# Patient Record
Sex: FEMALE | Race: BLACK OR AFRICAN AMERICAN | NOT HISPANIC OR LATINO | Employment: UNEMPLOYED | URBAN - NONMETROPOLITAN AREA
[De-identification: names, ages, dates, MRNs, and addresses within clinical notes are randomized per-mention and may not be internally consistent; named-entity substitution may affect disease eponyms.]

---

## 2023-12-03 ENCOUNTER — HOSPITAL ENCOUNTER (EMERGENCY)
Facility: HOSPITAL | Age: 38
Discharge: HOME | End: 2023-12-03
Attending: STUDENT IN AN ORGANIZED HEALTH CARE EDUCATION/TRAINING PROGRAM | Admitting: STUDENT IN AN ORGANIZED HEALTH CARE EDUCATION/TRAINING PROGRAM
Payer: COMMERCIAL

## 2023-12-03 VITALS
SYSTOLIC BLOOD PRESSURE: 143 MMHG | RESPIRATION RATE: 17 BRPM | OXYGEN SATURATION: 99 % | WEIGHT: 293 LBS | HEART RATE: 105 BPM | BODY MASS INDEX: 41.95 KG/M2 | TEMPERATURE: 97.7 F | DIASTOLIC BLOOD PRESSURE: 86 MMHG | HEIGHT: 70 IN

## 2023-12-03 DIAGNOSIS — R73.9 HYPERGLYCEMIA: Primary | ICD-10-CM

## 2023-12-03 LAB
ALBUMIN SERPL-MCNC: 4.3 G/DL (ref 3.5–5.2)
ALBUMIN/GLOB SERPL: 1.3 G/DL
ALP SERPL-CCNC: 109 U/L (ref 39–117)
ALT SERPL W P-5'-P-CCNC: 19 U/L (ref 1–33)
ANION GAP SERPL CALCULATED.3IONS-SCNC: 12.1 MMOL/L (ref 5–15)
AST SERPL-CCNC: 14 U/L (ref 1–32)
B-HCG UR QL: NEGATIVE
BASOPHILS # BLD AUTO: 0.02 10*3/MM3 (ref 0–0.2)
BASOPHILS NFR BLD AUTO: 0.3 % (ref 0–1.5)
BILIRUB SERPL-MCNC: 0.2 MG/DL (ref 0–1.2)
BILIRUB UR QL STRIP: NEGATIVE
BUN SERPL-MCNC: 16 MG/DL (ref 6–20)
BUN/CREAT SERPL: 13.9 (ref 7–25)
CALCIUM SPEC-SCNC: 9.3 MG/DL (ref 8.6–10.5)
CHLORIDE SERPL-SCNC: 101 MMOL/L (ref 98–107)
CLARITY UR: CLEAR
CO2 SERPL-SCNC: 17.9 MMOL/L (ref 22–29)
COLOR UR: YELLOW
CREAT SERPL-MCNC: 1.15 MG/DL (ref 0.57–1)
CRP SERPL-MCNC: 0.75 MG/DL (ref 0–0.5)
DEPRECATED RDW RBC AUTO: 43.1 FL (ref 37–54)
EGFRCR SERPLBLD CKD-EPI 2021: 62.7 ML/MIN/1.73
EOSINOPHIL # BLD AUTO: 0.07 10*3/MM3 (ref 0–0.4)
EOSINOPHIL NFR BLD AUTO: 1.1 % (ref 0.3–6.2)
ERYTHROCYTE [DISTWIDTH] IN BLOOD BY AUTOMATED COUNT: 14 % (ref 12.3–15.4)
FLUAV SUBTYP SPEC NAA+PROBE: NOT DETECTED
FLUBV RNA ISLT QL NAA+PROBE: NOT DETECTED
GLOBULIN UR ELPH-MCNC: 3.2 GM/DL
GLUCOSE BLDC GLUCOMTR-MCNC: 143 MG/DL (ref 70–130)
GLUCOSE SERPL-MCNC: 427 MG/DL (ref 65–99)
GLUCOSE UR STRIP-MCNC: ABNORMAL MG/DL
HCT VFR BLD AUTO: 34.8 % (ref 34–46.6)
HGB BLD-MCNC: 11.6 G/DL (ref 12–15.9)
HGB UR QL STRIP.AUTO: NEGATIVE
HOLD SPECIMEN: NORMAL
HOLD SPECIMEN: NORMAL
IMM GRANULOCYTES # BLD AUTO: 0.02 10*3/MM3 (ref 0–0.05)
IMM GRANULOCYTES NFR BLD AUTO: 0.3 % (ref 0–0.5)
KETONES UR QL STRIP: NEGATIVE
LEUKOCYTE ESTERASE UR QL STRIP.AUTO: NEGATIVE
LYMPHOCYTES # BLD AUTO: 2.53 10*3/MM3 (ref 0.7–3.1)
LYMPHOCYTES NFR BLD AUTO: 38.2 % (ref 19.6–45.3)
MAGNESIUM SERPL-MCNC: 1.8 MG/DL (ref 1.6–2.6)
MCH RBC QN AUTO: 28.1 PG (ref 26.6–33)
MCHC RBC AUTO-ENTMCNC: 33.3 G/DL (ref 31.5–35.7)
MCV RBC AUTO: 84.3 FL (ref 79–97)
MONOCYTES # BLD AUTO: 0.48 10*3/MM3 (ref 0.1–0.9)
MONOCYTES NFR BLD AUTO: 7.3 % (ref 5–12)
NEUTROPHILS NFR BLD AUTO: 3.5 10*3/MM3 (ref 1.7–7)
NEUTROPHILS NFR BLD AUTO: 52.8 % (ref 42.7–76)
NITRITE UR QL STRIP: NEGATIVE
NRBC BLD AUTO-RTO: 0 /100 WBC (ref 0–0.2)
PH UR STRIP.AUTO: 5.5 [PH] (ref 5–8)
PLATELET # BLD AUTO: 361 10*3/MM3 (ref 140–450)
PMV BLD AUTO: 9.6 FL (ref 6–12)
POTASSIUM SERPL-SCNC: 4.6 MMOL/L (ref 3.5–5.2)
PROCALCITONIN SERPL-MCNC: 0.05 NG/ML (ref 0–0.25)
PROT SERPL-MCNC: 7.5 G/DL (ref 6–8.5)
PROT UR QL STRIP: NEGATIVE
RBC # BLD AUTO: 4.13 10*6/MM3 (ref 3.77–5.28)
SARS-COV-2 RNA RESP QL NAA+PROBE: NOT DETECTED
SODIUM SERPL-SCNC: 131 MMOL/L (ref 136–145)
SP GR UR STRIP: >=1.03 (ref 1–1.03)
UROBILINOGEN UR QL STRIP: ABNORMAL
WBC NRBC COR # BLD AUTO: 6.62 10*3/MM3 (ref 3.4–10.8)
WHOLE BLOOD HOLD COAG: NORMAL
WHOLE BLOOD HOLD SPECIMEN: NORMAL

## 2023-12-03 PROCEDURE — 80053 COMPREHEN METABOLIC PANEL: CPT

## 2023-12-03 PROCEDURE — 85025 COMPLETE CBC W/AUTO DIFF WBC: CPT

## 2023-12-03 PROCEDURE — 87636 SARSCOV2 & INF A&B AMP PRB: CPT | Performed by: STUDENT IN AN ORGANIZED HEALTH CARE EDUCATION/TRAINING PROGRAM

## 2023-12-03 PROCEDURE — 86140 C-REACTIVE PROTEIN: CPT | Performed by: STUDENT IN AN ORGANIZED HEALTH CARE EDUCATION/TRAINING PROGRAM

## 2023-12-03 PROCEDURE — 81025 URINE PREGNANCY TEST: CPT | Performed by: STUDENT IN AN ORGANIZED HEALTH CARE EDUCATION/TRAINING PROGRAM

## 2023-12-03 PROCEDURE — 82948 REAGENT STRIP/BLOOD GLUCOSE: CPT

## 2023-12-03 PROCEDURE — 83735 ASSAY OF MAGNESIUM: CPT | Performed by: STUDENT IN AN ORGANIZED HEALTH CARE EDUCATION/TRAINING PROGRAM

## 2023-12-03 PROCEDURE — 25810000003 SODIUM CHLORIDE 0.9 % SOLUTION: Performed by: STUDENT IN AN ORGANIZED HEALTH CARE EDUCATION/TRAINING PROGRAM

## 2023-12-03 PROCEDURE — 81003 URINALYSIS AUTO W/O SCOPE: CPT

## 2023-12-03 PROCEDURE — 63710000001 INSULIN REGULAR HUMAN PER 5 UNITS: Performed by: STUDENT IN AN ORGANIZED HEALTH CARE EDUCATION/TRAINING PROGRAM

## 2023-12-03 PROCEDURE — 99283 EMERGENCY DEPT VISIT LOW MDM: CPT

## 2023-12-03 PROCEDURE — 84145 PROCALCITONIN (PCT): CPT | Performed by: STUDENT IN AN ORGANIZED HEALTH CARE EDUCATION/TRAINING PROGRAM

## 2023-12-03 RX ORDER — SODIUM CHLORIDE 0.9 % (FLUSH) 0.9 %
10 SYRINGE (ML) INJECTION AS NEEDED
Status: DISCONTINUED | OUTPATIENT
Start: 2023-12-03 | End: 2023-12-03 | Stop reason: HOSPADM

## 2023-12-03 RX ADMIN — HUMAN INSULIN 10 UNITS: 100 INJECTION, SOLUTION SUBCUTANEOUS at 12:04

## 2023-12-03 RX ADMIN — SODIUM CHLORIDE 1000 ML: 9 INJECTION, SOLUTION INTRAVENOUS at 11:05

## 2023-12-03 NOTE — Clinical Note
[facility] EMERGENCY DEPARTMENT  [address] [address]  Phone: [phone]    Alysia Lamas was seen and treated in our emergency department on 12/3/2023.  She may return to work on 12/06/2023.         Thank you for choosing [facility].    Romaine Mcknight MD

## 2023-12-03 NOTE — ED PROVIDER NOTES
Subjective:  History of Present Illness:    Patient is a 38-year-old female with history of obesity, hypertension, diabetes recently started on metformin who presents today with hyperglycemia.  Reports that she recently started her diabetes regimen.  Had just started checking her blood glucose at home.  Has not been consistently checking this.  Checked for the first time today and found her glucose to be greater than 400.  Called her PCPs office about this and they advised her to present emergently to our department for evaluation.  She does endorse diarrhea yesterday.  Has had some cough and congestion.  Denies chest pain or shortness of breath.  No abdominal pain.  No fever or chills.  Denies any dysuria.  No abnormal vaginal bleeding or discharge.      Nurses Notes reviewed and agree, including vitals, allergies, social history and prior medical history.     REVIEW OF SYSTEMS: All systems reviewed and not pertinent unless noted.  Review of Systems   Constitutional:  Positive for activity change. Negative for appetite change, chills, fatigue and fever.   HENT:  Positive for congestion. Negative for rhinorrhea, sinus pressure and sinus pain.    Eyes:  Negative for discharge and itching.   Respiratory:  Positive for cough. Negative for shortness of breath.    Cardiovascular:  Negative for chest pain and leg swelling.   Gastrointestinal:  Positive for diarrhea. Negative for abdominal distention, abdominal pain, constipation, nausea and vomiting.   Endocrine: Negative for cold intolerance and heat intolerance.   Genitourinary:  Negative for decreased urine volume, difficulty urinating, flank pain, frequency, urgency, vaginal bleeding, vaginal discharge and vaginal pain.   Musculoskeletal:  Negative for gait problem, neck pain and neck stiffness.   Skin:  Negative for color change.   Allergic/Immunologic: Negative for environmental allergies.   Neurological:  Negative for seizures, syncope, facial asymmetry and speech  "difficulty.   Psychiatric/Behavioral:  Negative for self-injury and suicidal ideas.        Past Medical History:   Diagnosis Date    Diabetes mellitus        Allergies:    Patient has no known allergies.      History reviewed. No pertinent surgical history.      Social History     Socioeconomic History    Marital status: Single         History reviewed. No pertinent family history.    Objective  Physical Exam:  /86   Pulse 105   Temp 97.7 °F (36.5 °C) (Oral)   Resp 17   Ht 177.8 cm (70\")   Wt 136 kg (300 lb)   LMP 11/11/2023 (Exact Date)   SpO2 99%   BMI 43.05 kg/m²      Physical Exam  Constitutional:       General: She is not in acute distress.     Appearance: Normal appearance. She is obese. She is not ill-appearing.   HENT:      Head: Normocephalic and atraumatic.      Nose: Nose normal. No congestion or rhinorrhea.      Mouth/Throat:      Mouth: Mucous membranes are dry.      Pharynx: Oropharynx is clear. No oropharyngeal exudate or posterior oropharyngeal erythema.   Eyes:      Extraocular Movements: Extraocular movements intact.      Conjunctiva/sclera: Conjunctivae normal.      Pupils: Pupils are equal, round, and reactive to light.   Cardiovascular:      Rate and Rhythm: Normal rate and regular rhythm.      Pulses: Normal pulses.      Heart sounds: Normal heart sounds.   Pulmonary:      Effort: Pulmonary effort is normal. No respiratory distress.      Breath sounds: Normal breath sounds.   Abdominal:      General: Abdomen is flat. Bowel sounds are normal. There is no distension.      Palpations: Abdomen is soft.      Tenderness: There is no abdominal tenderness. There is no guarding.   Musculoskeletal:         General: No swelling or tenderness. Normal range of motion.      Cervical back: Normal range of motion and neck supple.      Right lower leg: No edema.      Left lower leg: No edema.   Skin:     General: Skin is warm and dry.      Capillary Refill: Capillary refill takes less than 2 " seconds.   Neurological:      General: No focal deficit present.      Mental Status: She is alert and oriented to person, place, and time. Mental status is at baseline.      Cranial Nerves: No cranial nerve deficit.      Sensory: No sensory deficit.      Motor: No weakness.      Coordination: Coordination normal.   Psychiatric:         Mood and Affect: Mood normal.         Behavior: Behavior normal.         Thought Content: Thought content normal.         Judgment: Judgment normal.         Procedures    ED Course:         Lab Results (last 24 hours)       Procedure Component Value Units Date/Time    CBC & Differential [[id number]]  (Abnormal) Collected: 12/03/23 1059    Specimen: Blood Updated: 12/03/23 1107    Narrative:      The following orders were created for panel order CBC & Differential.  Procedure                               Abnormality         Status                     ---------                               -----------         ------                     CBC Auto Differential[[id number]]        Abnormal            Final result                 Please view results for these tests on the individual orders.    Comprehensive Metabolic Panel [[id number]]  (Abnormal) Collected: 12/03/23 1059    Specimen: Blood Updated: 12/03/23 1135     Glucose 427 mg/dL      BUN 16 mg/dL      Creatinine 1.15 mg/dL      Sodium 131 mmol/L      Potassium 4.6 mmol/L      Chloride 101 mmol/L      CO2 17.9 mmol/L      Calcium 9.3 mg/dL      Total Protein 7.5 g/dL      Albumin 4.3 g/dL      ALT (SGPT) 19 U/L      AST (SGOT) 14 U/L      Alkaline Phosphatase 109 U/L      Total Bilirubin 0.2 mg/dL      Globulin 3.2 gm/dL      A/G Ratio 1.3 g/dL      BUN/Creatinine Ratio 13.9     Anion Gap 12.1 mmol/L      eGFR 62.7 mL/min/1.73     Narrative:      GFR Normal >60  Chronic Kidney Disease <60  Kidney Failure <15      CBC Auto Differential [[id number]]  (Abnormal) Collected: 12/03/23 1059    Specimen: Blood Updated: 12/03/23 1107     WBC 6.62  10*3/mm3      RBC 4.13 10*6/mm3      Hemoglobin 11.6 g/dL      Hematocrit 34.8 %      MCV 84.3 fL      MCH 28.1 pg      MCHC 33.3 g/dL      RDW 14.0 %      RDW-SD 43.1 fl      MPV 9.6 fL      Platelets 361 10*3/mm3      Neutrophil % 52.8 %      Lymphocyte % 38.2 %      Monocyte % 7.3 %      Eosinophil % 1.1 %      Basophil % 0.3 %      Immature Grans % 0.3 %      Neutrophils, Absolute 3.50 10*3/mm3      Lymphocytes, Absolute 2.53 10*3/mm3      Monocytes, Absolute 0.48 10*3/mm3      Eosinophils, Absolute 0.07 10*3/mm3      Basophils, Absolute 0.02 10*3/mm3      Immature Grans, Absolute 0.02 10*3/mm3      nRBC 0.0 /100 WBC     Urinalysis With Microscopic If Indicated (No Culture) - Urine, Clean Catch [[id number]]  (Abnormal) Collected: 12/03/23 1100    Specimen: Urine, Clean Catch Updated: 12/03/23 1109     Color, UA Yellow     Appearance, UA Clear     pH, UA 5.5     Specific Gravity, UA >=1.030     Glucose, UA >=1000 mg/dL (3+)     Ketones, UA Negative     Bilirubin, UA Negative     Blood, UA Negative     Protein, UA Negative     Leuk Esterase, UA Negative     Nitrite, UA Negative     Urobilinogen, UA 0.2 E.U./dL    Narrative:      Urine microscopic not indicated.    Pregnancy, Urine - Urine, Clean Catch [[id number]]  (Normal) Collected: 12/03/23 1100    Specimen: Urine, Clean Catch Updated: 12/03/23 1111     HCG, Urine QL Negative    C-reactive Protein [[id number]]  (Abnormal) Collected: 12/03/23 1100    Specimen: Blood Updated: 12/03/23 1125     C-Reactive Protein 0.75 mg/dL     Procalcitonin [[id number]]  (Normal) Collected: 12/03/23 1100    Specimen: Blood Updated: 12/03/23 1135     Procalcitonin 0.05 ng/mL     Narrative:      As a Marker for Sepsis (Non-Neonates):    1. <0.5 ng/mL represents a low risk of severe sepsis and/or septic shock.  2. >2 ng/mL represents a high risk of severe sepsis and/or septic shock.    As a Marker for Lower Respiratory Tract Infections that require antibiotic therapy:    PCT on  "Admission    Antibiotic Therapy       6-12 Hrs later    >0.5                Strongly Recommended  >0.25 - <0.5        Recommended   0.1 - 0.25          Discouraged              Remeasure/reassess PCT  <0.1                Strongly Discouraged     Remeasure/reassess PCT    As 28 day mortality risk marker: \"Change in Procalcitonin Result\" (>80% or <=80%) if Day 0 (or Day 1) and Day 4 values are available. Refer to http://www.[email or web address]-pct-calculator.com    Change in PCT <=80%  A decrease of PCT levels below or equal to 80% defines a positive change in PCT test result representing a higher risk for 28-day all-cause mortality of patients diagnosed with severe sepsis for septic shock.    Change in PCT >80%  A decrease of PCT levels of more than 80% defines a negative change in PCT result representing a lower risk for 28-day all-cause mortality of patients diagnosed with severe sepsis or septic shock.       Magnesium [[id number]]  (Normal) Collected: 12/03/23 1100    Specimen: Blood Updated: 12/03/23 1125     Magnesium 1.8 mg/dL     COVID-19 and FLU A/B PCR, 1 HR TAT - Swab, Nasopharynx [[id number]]  (Normal) Collected: 12/03/23 1106    Specimen: Swab from Nasopharynx Updated: 12/03/23 1137     COVID19 Not Detected     Influenza A PCR Not Detected     Influenza B PCR Not Detected    Narrative:      Fact sheet for providers: https://www.fda.gov/media/986856/download    Fact sheet for patients: https://www.fda.gov/media/752363/download    Test performed by PCR.    POC Glucose Once [[id number]]  (Abnormal) Collected: 12/03/23 1232    Specimen: Blood Updated: 12/03/23 1236     Glucose 143 mg/dL      Comment: Serial Number: [id number]:  [id number]                No radiology results from the last 24 hrs       MDM      Initial impression of presenting illness: Hyperglycemia    DDX: includes but is not limited to: DKA, viral URI, type 2 diabetes    Patient arrives stable with vitals interpreted by myself.     Pertinent features " from physical exam: Clear to auscultation, regular rate and rhythm, no murmur, nontender to abdominal palpation.    Initial diagnostic plan: CBC, CMP, UA, CRP, Pro-Jacinto, COVID swab    Results from initial plan were reviewed and interpreted by me revealing no concern for DKA on lab work    Diagnostic information from other sources: Reviewed past medical records    Interventions / Re-evaluation: Given IV fluids on presentation, once DKA was ruled out, given 10 units of insulin with significant improvement    Results/clinical rationale were discussed with patient at bedside    Consultations/Discussion of results with other physicians: Discussed negative workup for DKA in our emergency department.  Encourage patient to follow-up with her primary care doctor for further evaluation and management of her diabetes.    Disposition plan: Discharge  -----        Final diagnoses:   Hyperglycemia          Romaine Mcknight MD  12/03/23 9012

## 2023-12-03 NOTE — DISCHARGE INSTRUCTIONS
You were evaluated due to high blood sugar.  We got labs which showed no concern for DKA.  We gave you IV fluids and insulin and your glucose improved.  You are now stable for discharge.  Would recommend continued follow-up with your primary care doctor for control of your diabetes and for any further changes that may be needed for your diabetes regimen.  You are now stable for discharge.

## 2025-02-25 PROBLEM — O09.519 ADVANCED MATERNAL AGE, PRIMIGRAVIDA, ANTEPARTUM: Status: ACTIVE | Noted: 2025-02-25

## 2025-02-25 PROBLEM — F19.11 HISTORY OF SUBSTANCE ABUSE: Status: ACTIVE | Noted: 2025-02-25

## 2025-02-25 PROBLEM — F17.200 SMOKER: Status: ACTIVE | Noted: 2025-02-25

## 2025-02-25 PROBLEM — O09.90 SUPERVISION OF HIGH RISK PREGNANCY, ANTEPARTUM: Status: ACTIVE | Noted: 2025-02-25

## 2025-02-25 PROBLEM — O99.210 OBESITY IN PREGNANCY, ANTEPARTUM: Status: ACTIVE | Noted: 2025-02-25

## 2025-02-25 PROBLEM — E11.9 TYPE 2 DIABETES MELLITUS: Status: ACTIVE | Noted: 2025-02-25

## 2025-02-25 PROBLEM — O10.919 CHRONIC HYPERTENSION AFFECTING PREGNANCY: Status: ACTIVE | Noted: 2025-02-25

## 2025-03-14 ENCOUNTER — INITIAL PRENATAL (OUTPATIENT)
Dept: OBSTETRICS AND GYNECOLOGY | Facility: CLINIC | Age: 40
End: 2025-03-14
Payer: COMMERCIAL

## 2025-03-14 ENCOUNTER — LAB (OUTPATIENT)
Dept: LAB | Facility: HOSPITAL | Age: 40
End: 2025-03-14
Payer: COMMERCIAL

## 2025-03-14 VITALS — SYSTOLIC BLOOD PRESSURE: 130 MMHG | WEIGHT: 293 LBS | BODY MASS INDEX: 43.48 KG/M2 | DIASTOLIC BLOOD PRESSURE: 76 MMHG

## 2025-03-14 DIAGNOSIS — O09.90 SUPERVISION OF HIGH RISK PREGNANCY, ANTEPARTUM: ICD-10-CM

## 2025-03-14 DIAGNOSIS — E11.9 TYPE 2 DIABETES MELLITUS WITHOUT COMPLICATION, WITHOUT LONG-TERM CURRENT USE OF INSULIN: ICD-10-CM

## 2025-03-14 DIAGNOSIS — O10.919 CHRONIC HYPERTENSION AFFECTING PREGNANCY: ICD-10-CM

## 2025-03-14 DIAGNOSIS — O09.519 ADVANCED MATERNAL AGE, PRIMIGRAVIDA, ANTEPARTUM: ICD-10-CM

## 2025-03-14 DIAGNOSIS — O09.90 SUPERVISION OF HIGH RISK PREGNANCY, ANTEPARTUM: Primary | ICD-10-CM

## 2025-03-14 PROBLEM — Z3A.12 12 WEEKS GESTATION OF PREGNANCY: Status: ACTIVE | Noted: 2025-03-14

## 2025-03-14 LAB
ABO GROUP BLD: NORMAL
ALBUMIN SERPL-MCNC: 3.2 G/DL (ref 3.5–5.2)
ALBUMIN/GLOB SERPL: 0.8 G/DL
ALP SERPL-CCNC: 86 U/L (ref 39–117)
ALT SERPL W P-5'-P-CCNC: 18 U/L (ref 1–33)
ANION GAP SERPL CALCULATED.3IONS-SCNC: 11.1 MMOL/L (ref 5–15)
AST SERPL-CCNC: 13 U/L (ref 1–32)
BASOPHILS # BLD AUTO: 0.02 10*3/MM3 (ref 0–0.2)
BASOPHILS NFR BLD AUTO: 0.2 % (ref 0–1.5)
BILIRUB SERPL-MCNC: 0.2 MG/DL (ref 0–1.2)
BLD GP AB SCN SERPL QL: NEGATIVE
BUN SERPL-MCNC: 7 MG/DL (ref 6–20)
BUN/CREAT SERPL: 9 (ref 7–25)
CALCIUM SPEC-SCNC: 9 MG/DL (ref 8.6–10.5)
CHLORIDE SERPL-SCNC: 104 MMOL/L (ref 98–107)
CO2 SERPL-SCNC: 20.9 MMOL/L (ref 22–29)
CREAT SERPL-MCNC: 0.78 MG/DL (ref 0.57–1)
DEPRECATED RDW RBC AUTO: 40.5 FL (ref 37–54)
EGFRCR SERPLBLD CKD-EPI 2021: 99.2 ML/MIN/1.73
EOSINOPHIL # BLD AUTO: 0.05 10*3/MM3 (ref 0–0.4)
EOSINOPHIL NFR BLD AUTO: 0.5 % (ref 0.3–6.2)
ERYTHROCYTE [DISTWIDTH] IN BLOOD BY AUTOMATED COUNT: 13.5 % (ref 12.3–15.4)
GLOBULIN UR ELPH-MCNC: 3.8 GM/DL
GLUCOSE SERPL-MCNC: 138 MG/DL (ref 65–99)
HBA1C MFR BLD: 8.8 % (ref 4.8–5.6)
HBV SURFACE AG SERPL QL IA: NORMAL
HCT VFR BLD AUTO: 33.6 % (ref 34–46.6)
HCV AB SER QL: NORMAL
HGB BLD-MCNC: 11 G/DL (ref 12–15.9)
HIV 1+2 AB+HIV1 P24 AG SERPL QL IA: NORMAL
IMM GRANULOCYTES # BLD AUTO: 0.04 10*3/MM3 (ref 0–0.05)
IMM GRANULOCYTES NFR BLD AUTO: 0.4 % (ref 0–0.5)
LYMPHOCYTES # BLD AUTO: 3.04 10*3/MM3 (ref 0.7–3.1)
LYMPHOCYTES NFR BLD AUTO: 28.4 % (ref 19.6–45.3)
MCH RBC QN AUTO: 26.8 PG (ref 26.6–33)
MCHC RBC AUTO-ENTMCNC: 32.7 G/DL (ref 31.5–35.7)
MCV RBC AUTO: 81.8 FL (ref 79–97)
MONOCYTES # BLD AUTO: 0.54 10*3/MM3 (ref 0.1–0.9)
MONOCYTES NFR BLD AUTO: 5.1 % (ref 5–12)
NEUTROPHILS NFR BLD AUTO: 65.4 % (ref 42.7–76)
NEUTROPHILS NFR BLD AUTO: 7 10*3/MM3 (ref 1.7–7)
NRBC BLD AUTO-RTO: 0 /100 WBC (ref 0–0.2)
PLATELET # BLD AUTO: 405 10*3/MM3 (ref 140–450)
PMV BLD AUTO: 9.2 FL (ref 6–12)
POTASSIUM SERPL-SCNC: 3.6 MMOL/L (ref 3.5–5.2)
PROT SERPL-MCNC: 7 G/DL (ref 6–8.5)
RBC # BLD AUTO: 4.11 10*6/MM3 (ref 3.77–5.28)
RH BLD: POSITIVE
RPR SER QL: NORMAL
SODIUM SERPL-SCNC: 136 MMOL/L (ref 136–145)
TSH SERPL DL<=0.05 MIU/L-ACNC: 0.43 UIU/ML (ref 0.27–4.2)
WBC NRBC COR # BLD AUTO: 10.69 10*3/MM3 (ref 3.4–10.8)

## 2025-03-14 PROCEDURE — 86592 SYPHILIS TEST NON-TREP QUAL: CPT

## 2025-03-14 PROCEDURE — 87340 HEPATITIS B SURFACE AG IA: CPT

## 2025-03-14 PROCEDURE — 86803 HEPATITIS C AB TEST: CPT

## 2025-03-14 PROCEDURE — 36415 COLL VENOUS BLD VENIPUNCTURE: CPT

## 2025-03-14 PROCEDURE — 86901 BLOOD TYPING SEROLOGIC RH(D): CPT

## 2025-03-14 PROCEDURE — 83036 HEMOGLOBIN GLYCOSYLATED A1C: CPT

## 2025-03-14 PROCEDURE — 80050 GENERAL HEALTH PANEL: CPT

## 2025-03-14 PROCEDURE — G0432 EIA HIV-1/HIV-2 SCREEN: HCPCS

## 2025-03-14 PROCEDURE — 86900 BLOOD TYPING SEROLOGIC ABO: CPT

## 2025-03-14 PROCEDURE — 86762 RUBELLA ANTIBODY: CPT

## 2025-03-14 PROCEDURE — 86850 RBC ANTIBODY SCREEN: CPT

## 2025-03-14 RX ORDER — ASPIRIN 81 MG/1
81 TABLET ORAL DAILY
Qty: 90 TABLET | Refills: 1 | Status: SHIPPED | OUTPATIENT
Start: 2025-03-14 | End: 2025-09-21

## 2025-03-14 RX ORDER — NIFEDIPINE 60 MG/1
60 TABLET, EXTENDED RELEASE ORAL DAILY
Qty: 90 TABLET | Refills: 2 | Status: SHIPPED | OUTPATIENT
Start: 2025-03-14

## 2025-03-14 NOTE — PROGRESS NOTES
Subjective     Chief Complaint   Patient presents with    Initial Prenatal Visit     NOB last seen 15 years        Alysia Lamas is a 39 y.o. year old .  Patient's last menstrual period was 12/15/2024..  She presents to be seen to initiate prenatal care with our practice.    She is currently having problems with none    As it relates to the pregnancy, she has concerns regarding Supervision of High Risk Pregnancy    AMA  Chronic HTN  Type 2 DM  Obesity  OUD in remission  Heavy Smoker    The following portions of the patient's history were reviewed and updated as appropriate:vital signs and She  has a past medical history of Anemia, Chlamydia, Diabetes, Diabetes mellitus, Gonorrhea, Hypertension, Urinary tract infection, and Urogenital trichomoniasis.  She does not have any pertinent problems on file.  She  has a past surgical history that includes Facial cosmetic surgery ().  Her family history is not on file.  She  reports that she has been smoking cigarettes. She has never used smokeless tobacco. She reports that she does not currently use alcohol. She reports that she does not use drugs.  Current Outpatient Medications   Medication Sig Dispense Refill    Blood Glucose Monitoring Suppl (FreeStyle Freedom Lite) w/Device kit 2 (Two) Times a Day. for testing      FREESTYLE LITE test strip 2 (Two) Times a Day. for testing      Lancets (freestyle) lancets USE FOR TESTING TWICE A DAY      pantoprazole (PROTONIX) 40 MG EC tablet Take 1 tablet by mouth Daily.      vitamin D3 (vitamin d) 125 MCG (5000 UT) capsule capsule Take 1 capsule by mouth every night at bedtime.      Vivitrol 380 MG reconstituted suspension IM suspension INJECT 380MG INTRAMUSCULARLY EVERY 28 DAYS      aspirin 81 MG EC tablet Take 1 tablet by mouth Daily for 191 days. 90 tablet 1    NIFEdipine XL (Procardia XL) 60 MG 24 hr tablet Take 1 tablet by mouth Daily. 90 tablet 2    sulfamethoxazole-trimethoprim (Bactrim DS) 800-160 MG per tablet  Take 1 tablet by mouth 2 (Two) Times a Day. 14 tablet 0     No current facility-administered medications for this visit.     Current Outpatient Medications on File Prior to Visit   Medication Sig    Blood Glucose Monitoring Suppl (FreeStyle Freedom Lite) w/Device kit 2 (Two) Times a Day. for testing    FREESTYLE LITE test strip 2 (Two) Times a Day. for testing    Lancets (freestyle) lancets USE FOR TESTING TWICE A DAY    pantoprazole (PROTONIX) 40 MG EC tablet Take 1 tablet by mouth Daily.    vitamin D3 (vitamin d) 125 MCG (5000 UT) capsule capsule Take 1 capsule by mouth every night at bedtime.    Vivitrol 380 MG reconstituted suspension IM suspension INJECT 380MG INTRAMUSCULARLY EVERY 28 DAYS    [DISCONTINUED] amLODIPine (NORVASC) 5 MG tablet Take 1 tablet by mouth Daily.    [DISCONTINUED] atorvastatin (LIPITOR) 20 MG tablet Take 1 tablet by mouth every night at bedtime.    [DISCONTINUED] Januvia 25 MG tablet Take 1 tablet by mouth Daily.    [DISCONTINUED] metFORMIN (GLUCOPHAGE) 500 MG tablet Take 1 tablet by mouth 2 (Two) Times a Day With Meals.    [DISCONTINUED] Ozempic, 1 MG/DOSE, 4 MG/3ML solution pen-injector INJECT 1 MG SUBCUTANEOUSLY ONE TIME PER WEEK -ON THE SAME DAY EACH WEEK    sulfamethoxazole-trimethoprim (Bactrim DS) 800-160 MG per tablet Take 1 tablet by mouth 2 (Two) Times a Day.    [DISCONTINUED] ibuprofen (ADVIL,MOTRIN) 600 MG tablet TAKE 1 TABLET BY MOUTH TWICE A DAY WITH FOOD AS NEEDED     No current facility-administered medications on file prior to visit.     She has no known allergies..    Review of Systems - 14 point reviewed and negative    Objective     Physical Exam  General:  obese - Body mass index is 43.48 kg/m².   Constitutional: healthy   Skin:  No suspicious lesions seen   Thyroid: normal to inspection and palpation   Lungs:  breathing is unlabored  clear to auscultation bilaterally   Heart:  regular rate and rhythm, S1, S2 normal, no murmur, click, rub or gallop   Breasts:   Examined in supine position  Symmetric without masses or skin dimpling  Nipples normal without inversion, lesions or discharge  There are no palpable axillary nodes   Abdomen: soft, non-tender; no masses  no umbilical or inginual hernias are present  no hepato-splenomegaly   Pelvis: Exam limited by  body habitus  Clinical staff was present for exam  External genitalia:  normal appearance of the external genitalia including Bartholin's and Bejou's glands.  :  urethral meatus normal; urethral hypermobility is absent.  Vaginal:  normal pink mucosa without prolapse or lesions. discharge present -  white, thick, and routine culture obtained;  Cervix:  normal appearance  Uterus:  symmetrically enlarged, consisent with 10-12 weeks size;  Adnexa:  normal bimanual exam of the adnexa.   Musculoskeletal: negative   Neuro: normal without focal findings, mental status, speech normal, alert and oriented x3, and ILEANA   Psych: oriented to time, place and person, mood and affect are within normal limits, pt is a good historian; no memory problems were noted       Lab Review   CBC, CMP, and Hep C PRC    Imaging   Pelvic ultrasound images independantly reviewed - CWD    Assessment & Plan     ASSESSMENT  IUP at 12w5d  AMA  Chronic HTN, long-standing. Well controled on Norvasc though recently stopped. Will re-start calcium channel blocker, routine labs low dose ASA  Type 2 DM On multiple meds but not insulin. Off meds now. Discussed glycemic goals, testing regimen, MFM consult. Use of insulin in pregnancy  Obesity. Reviewed diet exercise Maternal weight gain  Heavy Smoker. Motivate to stop. Strategies discussed  OUD in sustained remission on no MAT. Discussed risk of relapse and fatal overdose. Discussed early intervention as needed. May benefit from re-starting Vivitrol in pregnancy     PLAN  Tests ordered today:  Orders Placed This Encounter   Procedures    Gardnerella vaginalis, Trichomonas vaginalis, Candida albicans, DNA - Swab,  Vagina     Standing Status:   Future     Expected Date:   3/14/2025     Expiration Date:   3/7/2026     Release to patient:   Routine Release [[id number]]    [facility]     Standing Status:   Future     Expected Date:   3/21/2025     Expiration Date:   3/14/2026     Reason for Exam::   Dr. Lobo consultation diabetes in pregnancy     Release to patient:   Routine Release [[id number]]    Urinalysis With Culture If Indicated - Urine, Clean Catch     Standing Status:   Future     Expected Date:   3/14/2025     Expiration Date:   3/7/2026     Release to patient:   Routine Release [[id number]]    Urine Drug Screen - Urine, Clean Catch     Standing Status:   Future     Expected Date:   3/14/2025     Expiration Date:   3/7/2026     Release to patient:   Routine Release [[id number]]    HIV-1 / O / 2 Ag / Antibody     Standing Status:   Future     Expected Date:   3/14/2025     Expiration Date:   3/7/2026     Release to patient:   Routine Release [[id number]]    Obstetric Panel     Standing Status:   Future     Expected Date:   3/14/2025     Expiration Date:   3/7/2026     Release to patient:   Routine Release [[id number]]    Hemoglobin A1c     Standing Status:   Future     Expected Date:   3/14/2025     Expiration Date:   3/7/2026     Release to patient:   Routine Release [[id number]]    Protein, Urine, 24 Hour - Urine, Clean Catch     Standing Status:   Future     Expected Date:   3/14/2025     Expiration Date:   2026     Release to patient:   Routine Release [[id number]]    Comprehensive Metabolic Panel     Standing Status:   Future     Expected Date:   3/14/2025     Expiration Date:   3/14/2026     Release to patient:   Routine Release [[id number]]    [facility]netta Panorama Prenatal Test: Chromosomes 13, 18, 21, X & Y: Triploidy 22Q.11.2 Deletion - Blood,     ==========Department Information==========    ID: [id number]    Department:[facility]  [address] [address]  Dept: [phone]  Dept Fax: [phone]  Loc: [phone]  Loc Fax: [phone]       Standing Status:   Future     Expected Date:   3/14/2025     Expiration Date:   6/14/2026     Is patient pregnant?:   Yes     Expected due date (MM/DD/YYYY)::   9/21/2025     Is this a twin pregnancy? (viable, no vanished twin):   No     Is this a surrogate or egg donor pregnancy?:   No     I want fetal sex included in the report::   Yes     Patient's weight (lbs)::   303     Opt out of 22q11.2?:   No     Enroll this patient in the Automatic Redraw Program?:   Yes     What type of billing?:   [employer]     Did patient sign the Patient Acknowledgement?:   Yes     Did the ordering clinician sign the Statement of Informed Consent?:   Yes     Blood draw managed by Annetta?:   No     Release to patient:   Routine Release [[id number]]    Annetta Horizon 14 (Pan-Ethnic Standard) - Blood,     ==========Department Information==========    ID: [id number]    [facility]  [address] [id number]  Dept: [phone]  Dept Fax: [phone]  Loc: [phone]  Loc Fax: [phone]       Standing Status:   Future     Expected Date:   3/14/2025     Expiration Date:   6/14/2026     Is patient pregnant?:   Yes     Ethnicity of patient::        Is patient currently using hormonal medications?:   No     Did the ordering clinician sign the Statement of Informed Consent?:   Yes     What type of billing?:   [employer]     You acknowledge that the testing ordered consists of the H14 Pan-Ethnic Panel plus additional genes?:   Yes     Patient authorizes Annetta to share patient's Horizon test results with partner and their medical provider?:   No     Practice ensures that HIPAA consent is obtained and will make available to Annetta upon request?:   Yes     Did patient sign the Patient  Acknowledgement?:   Yes     Do you want Annetta to schedule mobile phlebotomy for this patient?:   No     Lloyd-Sac add-on test?:   No     Release to patient:   Routine Release [[id number]]    TSH     Standing Status:   Future     Expected Date:   3/14/2025     Expiration Date:   3/14/2026     Release to patient:   Routine Release [[id number]]    ECG 12 Lead     Standing Status:   Future     Expected Date:   3/14/2025     Expiration Date:   3/14/2026     Reason for Exam::   HTN High risk Pregnancy     Release to patient:   Routine Release [[id number]]    Creatinine Clearance - Urine, Clean Catch     Standing Status:   Future     Expected Date:   3/14/2025     Expiration Date:   6/14/2026     Release to patient:   Routine Release [[id number]]     Medications prescribed today:  New Medications Ordered This Visit   Medications    aspirin 81 MG EC tablet     Sig: Take 1 tablet by mouth Daily for 191 days.     Dispense:  90 tablet     Refill:  1    NIFEdipine XL (Procardia XL) 60 MG 24 hr tablet     Sig: Take 1 tablet by mouth Daily.     Dispense:  90 tablet     Refill:  2     Information reviewed: smoking in pregnancy, exercise in pregnancy, nutrition in pregnancy, weight gain in pregnancy, work and travel restrictions during pregnancy, list of OTC medications acceptable in pregnancy, and call coverage groups  Genetic testing reviewed: NIPT (Panorama) and Amniocentesis  The problem list for pregnancy was initiated today  Low Dose  ASA  MFM Consult      Follow up: 2 week(s)         Total time spent today with Alysia was 60 minutes (level 5) - preparing to see the patient, documenting the encounter, independently interpreting test results, counseling and educating the patient, coordinating care and answering her questions and counseling regarding pathophysiology of her presenting problem along with plans for any diagnositc work-up and treatment.  The time reported only includes face-to-face time and excludes any  procedural based activities that occurred on the same date of service.   This note was electronically signed.    Armando Martinez MD  March 14, 2025

## 2025-03-15 LAB — RUBV IGG SERPL IA-ACNC: 1.14 INDEX

## 2025-03-17 ENCOUNTER — REFERRAL TRIAGE (OUTPATIENT)
Dept: LABOR AND DELIVERY | Facility: HOSPITAL | Age: 40
End: 2025-03-17
Payer: COMMERCIAL

## 2025-03-17 PROBLEM — O99.019 MATERNAL ANEMIA IN PREGNANCY, ANTEPARTUM: Status: ACTIVE | Noted: 2025-03-17

## 2025-03-28 LAB
Lab: ABNORMAL
Lab: POSITIVE
NTRA ALPHA-THALASSEMIA: POSITIVE
NTRA BETA-HEMOGLOBINOPATHIES: NEGATIVE
NTRA CANAVAN DISEASE: NEGATIVE
NTRA CYSTIC FIBROSIS: NEGATIVE
NTRA DUCHENNE/BECKER MUSCULAR DYSTROPHY: NEGATIVE
NTRA FAMILIAL DYSAUTONOMIA: NEGATIVE
NTRA FRAGILE X SYNDROME: NEGATIVE
NTRA GALACTOSEMIA: NEGATIVE
NTRA GAUCHER DISEASE: NEGATIVE
NTRA MEDIUM CHAIN ACYL-COA DEHYDROGENASE DEFICIENCY: NEGATIVE
NTRA POLYCYSTIC KIDNEY DISEASE, AUTOSOMAL RECESSIVE: NEGATIVE
NTRA SMITH-LEMLI-OPITZ SYNDROME: NEGATIVE
NTRA SPINAL MUSCULAR ATROPHY: NEGATIVE
NTRA TAY-SACHS DISEASE: NEGATIVE

## 2025-03-31 RX ORDER — VITAMIN A, VITAMIN C, VITAMIN D-3, VITAMIN E, VITAMIN B-1, VITAMIN B-2, NIACIN, VITAMIN B-6, CALCIUM, IRON, ZINC, COPPER 4000; 120; 400; 22; 1.84; 3; 20; 10; 1; 12; 200; 27; 25; 2 [IU]/1; MG/1; [IU]/1; MG/1; MG/1; MG/1; MG/1; MG/1; MG/1; UG/1; MG/1; MG/1; MG/1; MG/1
1 TABLET ORAL DAILY
Qty: 100 TABLET | Refills: 3 | Status: SHIPPED | OUTPATIENT
Start: 2025-03-31

## 2025-04-01 ENCOUNTER — PATIENT OUTREACH (OUTPATIENT)
Dept: LABOR AND DELIVERY | Facility: HOSPITAL | Age: 40
End: 2025-04-01
Payer: COMMERCIAL

## 2025-04-01 NOTE — OUTREACH NOTE
[facility]  Enrollment    Current Estimated Gestational Age: 15w2d    Questions/Answers      Flowsheet Row Responses   Would like to participate? Yes   Date of Intake Visit 04/02/25              ANTON Mercado RN  Maternity Nurse Navigator    4/1/2025, 16:16 EDT

## 2025-04-02 ENCOUNTER — PATIENT OUTREACH (OUTPATIENT)
Dept: LABOR AND DELIVERY | Facility: HOSPITAL | Age: 40
End: 2025-04-02
Payer: COMMERCIAL
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
## 2025-04-07 ENCOUNTER — ROUTINE PRENATAL (OUTPATIENT)
Dept: OBSTETRICS AND GYNECOLOGY | Facility: CLINIC | Age: 40
End: 2025-04-07
Payer: COMMERCIAL

## 2025-04-07 VITALS — SYSTOLIC BLOOD PRESSURE: 122 MMHG | BODY MASS INDEX: 42.24 KG/M2 | WEIGHT: 293 LBS | DIASTOLIC BLOOD PRESSURE: 76 MMHG

## 2025-04-07 DIAGNOSIS — Z3A.16 16 WEEKS GESTATION OF PREGNANCY: ICD-10-CM

## 2025-04-07 DIAGNOSIS — O09.519 ADVANCED MATERNAL AGE, PRIMIGRAVIDA, ANTEPARTUM: ICD-10-CM

## 2025-04-07 DIAGNOSIS — F17.200 SMOKER: ICD-10-CM

## 2025-04-07 DIAGNOSIS — O09.90 SUPERVISION OF HIGH RISK PREGNANCY, ANTEPARTUM: Primary | ICD-10-CM

## 2025-04-07 DIAGNOSIS — E11.9 TYPE 2 DIABETES MELLITUS WITHOUT COMPLICATION, WITHOUT LONG-TERM CURRENT USE OF INSULIN: ICD-10-CM

## 2025-04-07 DIAGNOSIS — O10.919 CHRONIC HYPERTENSION AFFECTING PREGNANCY: ICD-10-CM

## 2025-04-07 PROCEDURE — 99214 OFFICE O/P EST MOD 30 MIN: CPT | Performed by: OBSTETRICS & GYNECOLOGY

## 2025-04-07 RX ORDER — AMLODIPINE BESYLATE 5 MG/1
TABLET ORAL
COMMUNITY
Start: 2025-04-03

## 2025-04-07 RX ORDER — SITAGLIPTIN 25 MG/1
25 TABLET, FILM COATED ORAL DAILY
COMMUNITY
Start: 2025-04-03

## 2025-04-07 RX ORDER — SEMAGLUTIDE 2.68 MG/ML
2 INJECTION, SOLUTION SUBCUTANEOUS WEEKLY
COMMUNITY
Start: 2025-03-06

## 2025-04-07 NOTE — PROGRESS NOTES
Chief Complaint   Patient presents with    Routine Prenatal Visit       HPI: Alysia is a  currently at 16w1d who today reports the following:Headache - No ; Visual change - No ; Swelling in legs - No ; Nausea - No ; Constipation - No; Diarrhea - No ; Contractions - No ; Leaking fluid - No ; Vaginal bleeding -  No.      ROS: Pertinent items are noted in HPI.  Vitals: See prenatal flowsheet     EXAM:  Abdomen: See physician component of prenatal flowsheet   Urine glucose/protein: See physician component of prenatal flowsheet   Pelvic: See physician component of prenatal flowsheet     Prenatal Labs  Lab Results   Component Value Date    HGB 11.0 (L) 2025    RUBELLAABIGG 1.14 2025    HEPBSAG Non-Reactive 2025    ABO B 2025    RH Positive 2025    ABSCRN Negative 2025    QSK3CMZ2 Non-Reactive 2025    HEPCVIRUSABY Non-Reactive 2025       MDM:  Impression:supervision of high risk pregnancy, Multiparous patient with medical complications, Tobacco Abuse, Obesity, AMA, and Carrier of Alpha Thalassemia Chronic HTN, baseline 24 hour urine not completed. Normotensive on current medication. Type 2 DM with sub-optimal control on oral agent. Fastings well above goal. Will initiate Nightly long-acting insulin awaiting scheduling of MFM consultation  Tests done today:  MSAFP  Specific topics discussed at today's visit:  Alpha thalassemia testing of FOB, ordered. Glycemic control and insulin use. Glycemic goals  New Medications Ordered This Visit   Medications    Insulin Glargine (LANTUS SOLOSTAR) 100 UNIT/ML injection pen     Sig: Inject 10 Units under the skin into the appropriate area as directed Every Night.     Dispense:  15 mL     Refill:  2     Orders Placed This Encounter   Procedures    [facility]     Standing Status:   Future     Expected Date:   2025     Expiration Date:   2026     Reason for Exam::   screen Consult re: Type 2 DM      Release to patient:   Routine Release [[id number]]    Alpha Fetoprotein, Maternal     Standing Status:   Future     Expected Date:   4/12/2025     Expiration Date:   7/7/2026     [facility] Date of last menstrual period or estimated date of delivery (corresponding to calculation method)::   9/21/2025     [facility] Gestational age calculation method::   JUNE,EDC     Release to patient:   Routine Release [[id number]]    Protein, Urine, 24 Hour - Urine, Clean Catch     Standing Status:   Future     Expected Date:   4/7/2025     Expiration Date:   6/14/2026     Release to patient:   Routine Release [[id number]]    Creatinine Clearance - Urine, Clean Catch     Standing Status:   Future     Expected Date:   4/7/2025     Expiration Date:   6/14/2026     Release to patient:   Routine Release [[id number]]       Next visit: MFM and appt in 2 weeks

## 2025-04-28 ENCOUNTER — HOSPITAL ENCOUNTER (OUTPATIENT)
Dept: WOMENS IMAGING | Facility: HOSPITAL | Age: 40
Discharge: HOME | End: 2025-04-28
Admitting: OBSTETRICS & GYNECOLOGY
Payer: COMMERCIAL

## 2025-04-28 ENCOUNTER — DOCUMENTATION (OUTPATIENT)
Dept: OBSTETRICS AND GYNECOLOGY | Facility: HOSPITAL | Age: 40
End: 2025-04-28
Payer: COMMERCIAL

## 2025-04-28 ENCOUNTER — ROUTINE PRENATAL (OUTPATIENT)
Dept: OBSTETRICS AND GYNECOLOGY | Facility: CLINIC | Age: 40
End: 2025-04-28
Payer: COMMERCIAL

## 2025-04-28 ENCOUNTER — OFFICE VISIT (OUTPATIENT)
Dept: OBSTETRICS AND GYNECOLOGY | Facility: HOSPITAL | Age: 40
End: 2025-04-28
Payer: COMMERCIAL

## 2025-04-28 VITALS — SYSTOLIC BLOOD PRESSURE: 125 MMHG | WEIGHT: 293 LBS | DIASTOLIC BLOOD PRESSURE: 55 MMHG | BODY MASS INDEX: 42.04 KG/M2

## 2025-04-28 VITALS — SYSTOLIC BLOOD PRESSURE: 125 MMHG | DIASTOLIC BLOOD PRESSURE: 55 MMHG | WEIGHT: 293 LBS | BODY MASS INDEX: 42.04 KG/M2

## 2025-04-28 DIAGNOSIS — E11.9 TYPE 2 DIABETES MELLITUS WITHOUT COMPLICATION, WITHOUT LONG-TERM CURRENT USE OF INSULIN: Primary | ICD-10-CM

## 2025-04-28 DIAGNOSIS — O09.519 ADVANCED MATERNAL AGE, PRIMIGRAVIDA, ANTEPARTUM: ICD-10-CM

## 2025-04-28 DIAGNOSIS — O09.90 SUPERVISION OF HIGH RISK PREGNANCY, ANTEPARTUM: ICD-10-CM

## 2025-04-28 DIAGNOSIS — F17.200 SMOKER: ICD-10-CM

## 2025-04-28 DIAGNOSIS — F19.11 HISTORY OF SUBSTANCE ABUSE: ICD-10-CM

## 2025-04-28 DIAGNOSIS — O10.919 CHRONIC HYPERTENSION AFFECTING PREGNANCY: ICD-10-CM

## 2025-04-28 DIAGNOSIS — O99.210 OBESITY IN PREGNANCY, ANTEPARTUM: ICD-10-CM

## 2025-04-28 DIAGNOSIS — D56.3 ALPHA THALASSEMIA SILENT CARRIER: ICD-10-CM

## 2025-04-28 DIAGNOSIS — Z3A.19 19 WEEKS GESTATION OF PREGNANCY: ICD-10-CM

## 2025-04-28 DIAGNOSIS — O99.019 MATERNAL ANEMIA IN PREGNANCY, ANTEPARTUM: ICD-10-CM

## 2025-04-28 DIAGNOSIS — E11.9 TYPE 2 DIABETES MELLITUS WITHOUT COMPLICATION, WITHOUT LONG-TERM CURRENT USE OF INSULIN: ICD-10-CM

## 2025-04-28 PROCEDURE — 99214 OFFICE O/P EST MOD 30 MIN: CPT | Performed by: OBSTETRICS & GYNECOLOGY

## 2025-04-28 PROCEDURE — 76811 OB US DETAILED SNGL FETUS: CPT

## 2025-04-28 PROCEDURE — 76811 OB US DETAILED SNGL FETUS: CPT | Performed by: OBSTETRICS & GYNECOLOGY

## 2025-04-28 PROCEDURE — 99203 OFFICE O/P NEW LOW 30 MIN: CPT | Performed by: OBSTETRICS & GYNECOLOGY

## 2025-04-28 RX ORDER — ACYCLOVIR 400 MG/1
1 TABLET ORAL ONCE
Qty: 3 EACH | Refills: 11 | Status: SHIPPED | OUTPATIENT
Start: 2025-04-28 | End: 2025-04-28

## 2025-04-28 RX ORDER — PEN NEEDLE, DIABETIC 30 GX3/16"
1 NEEDLE, DISPOSABLE MISCELLANEOUS 4 TIMES DAILY
Qty: 100 EACH | Refills: 2 | Status: SHIPPED | OUTPATIENT
Start: 2025-04-28

## 2025-04-28 RX ORDER — ACYCLOVIR 400 MG/1
1 TABLET ORAL ONCE
Qty: 1 EACH | Refills: 0 | Status: SHIPPED | OUTPATIENT
Start: 2025-04-28 | End: 2025-04-28

## 2025-04-28 NOTE — ASSESSMENT & PLAN NOTE
Pregestational diabetics have an 8-10% risk for having a child with a congenital malformation.  The risk is lower if the patient has good glycemic control prior to conception. Women who have a normal hemoglobin A1C, have an incidence of birth defects comparable to the general population risk of 3-5%.  Women whose hemoglobin A1C is elevated have the highest risk for fetal malformations and the risk is proportional to the degree of elevation. The most frequent malformations found in infants of diabetic mothers are heart defects. The infants may also have neural tube defects, caudal regression, and limb abnormalities, including femoral hypoplasia.  A periconceptual A1C of >8.5% has been associated with a >20% risk for congenital anomalies.  When the HgbA1C is severely elevated there is also an increased risk of miscarriage.      Today we discussed the risks of diabetes and pregnancy including maternal risks: increasing insulin requirements, operative delivery, preeclampsia, and infection; as well as fetal risks: malformations as discussed above, growth abnormalities, iatrogenic  birth and stillbirth.   morbidities among infants of diabetic mothers include hypoglycemia, hyperbilirubinemia, and metabolic instability.     I have given her our goals for pregnancy glucose control of fasting glucose 95, 2 hr postprandial glucose <120, and 1 hr postprandial glucose <140.  She is going to keep track of these 4 values daily     Recommendations:  - Baseline pre-eclampsia evaluation including 24 hour urine protein. Aspirin supplementation for pre-eclampsia prevention.   - EKG, Opthalmologic evaluation.   - Detailed anatomy ultrasound at 20 weeks  - Fetal echocardiogram at 24-26 weeks  - Serial growth assessment (every 4 wks)  -  testing: to begin at 32 wks unless indication develops prior.  - Continue glucose log and adjust medications as needed.  - Delivery should be based on glycemic control with ideal  delivery at 39 wks if well controlled and delivery prior necessitated by poor control.

## 2025-04-28 NOTE — ASSESSMENT & PLAN NOTE
Patient will be 39 at time of delivery which classifies patient as advanced maternal age. Patient has previously had NIPT drawn and is no increased risk. Today we discussed that her risk of having a child with aneuploidy is now 1 in [id number]. We discussed the limitations of NIPT and that it is a screening testing only for chromosome 21, 13, 18 and sex chromosomes. Patient was offered definitive diagnostic testing with amniocentesis, but patient currently declines.

## 2025-04-28 NOTE — PROGRESS NOTES
Spoke with patient in person.  Diabetic folder given.  Discussed keeping a blood sugar log until a Dexcom is approved, how to send a log in through MyChart and diet.  Dexcom alexandrea instruction given as well.  Informed patient that most insurance companies require a prior authorization and that can take a couple of days but to continue to keep a log until that time.  Patient voices understanding and denies any needs.  Patient states she was still taking her Ozempic, confirmed with Dr. Collado and informed patient she is to discontinue the Ozempic during the pregnancy.  Angela Caputo RN

## 2025-04-28 NOTE — PROGRESS NOTES
Maternal/Fetal Medicine Consult Note   Date: 2025  Name: Alysia Lamas    : 1985     MRN: [id number]     Referring Provider: Armando Martinez MD    Chief Complaint  T2DM; AMA; Smoke/vape; CHTN; MO    Subjective     History of Present Illness:  Alysia Lamas is a 39 y.o.  19w1d who presents today for AMA, chronic hypertension, type 2 diabetes, alpha thalassemia carrier    JUNE: Estimated Date of Delivery: 25     ROS:   Otherwise Noted in HPI    Past Medical History:   Diagnosis Date    Anemia     Chlamydia     Diabetes     Diabetes mellitus     Gonorrhea     Hypertension     Substance use disorder     in remission. Sober since 2023    Urinary tract infection     Urogenital trichomoniasis       Past Surgical History:   Procedure Laterality Date    FACIAL COSMETIC SURGERY        OB History          2    Para   1    Term   1            AB        Living   1         SAB        IAB        Ectopic        Molar        Multiple        Live Births   1                Current Outpatient Medications:     aspirin 81 MG EC tablet, Take 1 tablet by mouth Daily for 191 days., Disp: 90 tablet, Rfl: 1    Blood Glucose Monitoring Suppl (FreeStyle Freedom Lite) w/Device kit, 2 (Two) Times a Day. for testing, Disp: , Rfl:     Ferric Maltol (ACCRUFeR) 30 MG capsule, Take 1 capsule by mouth 2 (Two) Times a Day Before Meals., Disp: 100 capsule, Rfl: 2    FREESTYLE LITE test strip, 2 (Two) Times a Day. for testing, Disp: , Rfl:     Januvia 25 MG tablet, Take 1 tablet by mouth Daily., Disp: , Rfl:     Lancets (freestyle) lancets, USE FOR TESTING TWICE A DAY, Disp: , Rfl:     metFORMIN (GLUCOPHAGE) 1000 MG tablet, Take 1 tablet by mouth 2 (Two) Times a Day With Meals., Disp: , Rfl:     NIFEdipine XL (Procardia XL) 60 MG 24 hr tablet, Take 1 tablet by mouth Daily., Disp: 90 tablet, Rfl: 2    pantoprazole (PROTONIX) 40 MG EC tablet, Take 1 tablet by mouth Daily., Disp: , Rfl:     Prenatal  "Vit-Fe Fumarate-FA (Prenatal Vitamin Plus Low Iron) 27-1 MG tablet, Take 1 tablet by mouth Daily., Disp: 100 tablet, Rfl: 3    amLODIPine (NORVASC) 5 MG tablet, , Disp: , Rfl:     Insulin Glargine (LANTUS SOLOSTAR) 100 UNIT/ML injection pen, Inject 10 Units under the skin into the appropriate area as directed Every Night. (Patient not taking: Reported on 2025), Disp: 15 mL, Rfl: 2    Ozempic, 2 MG/DOSE, 8 MG/3ML solution pen-injector, Inject 2 mg under the skin into the appropriate area as directed 1 (One) Time Per Week. (Patient not taking: Reported on 2025), Disp: , Rfl:     Objective     Vital Signs  /55   Wt 133 kg (293 lb)   LMP 12/15/2024   Estimated body mass index is 42.04 kg/m² as calculated from the following:    Height as of 24: 177.8 cm (70\").    Weight as of this encounter: 133 kg (293 lb).    Ultrasound Impression:   See Viewpoint     Assessment and Plan     Alysia Lamas is a 39 y.o.  19w1d who presents today for AMA, chronic hypertension, type 2 diabetes, alpha thalassemia carrier    Diagnoses and all orders for this visit:    1. Type 2 diabetes mellitus without complication, without long-term current use of insulin (Primary)  Assessment & Plan:  Pregestational diabetics have an 8-10% risk for having a child with a congenital malformation.  The risk is lower if the patient has good glycemic control prior to conception. Women who have a normal hemoglobin A1C, have an incidence of birth defects comparable to the general population risk of 3-5%.  Women whose hemoglobin A1C is elevated have the highest risk for fetal malformations and the risk is proportional to the degree of elevation. The most frequent malformations found in infants of diabetic mothers are heart defects. The infants may also have neural tube defects, caudal regression, and limb abnormalities, including femoral hypoplasia.  A periconceptual A1C of >8.5% has been associated with a >20% risk for congenital " anomalies.  When the HgbA1C is severely elevated there is also an increased risk of miscarriage.      Today we discussed the risks of diabetes and pregnancy including maternal risks: increasing insulin requirements, operative delivery, preeclampsia, and infection; as well as fetal risks: malformations as discussed above, growth abnormalities, iatrogenic  birth and stillbirth.   morbidities among infants of diabetic mothers include hypoglycemia, hyperbilirubinemia, and metabolic instability.     I have given her our goals for pregnancy glucose control of fasting glucose 95, 2 hr postprandial glucose <120, and 1 hr postprandial glucose <140.  She is going to keep track of these 4 values daily     Recommendations:  - Baseline pre-eclampsia evaluation including 24 hour urine protein. Aspirin supplementation for pre-eclampsia prevention.   - EKG, Opthalmologic evaluation.   - Detailed anatomy ultrasound at 20 weeks  - Fetal echocardiogram at 24-26 weeks  - Serial growth assessment (every 4 wks)  -  testing: to begin at 32 wks unless indication develops prior.  - Continue glucose log and adjust medications as needed.  - Delivery should be based on glycemic control with ideal delivery at 39 wks if well controlled and delivery prior necessitated by poor control.        2. Chronic hypertension affecting pregnancy  Assessment & Plan:  Pt has a diagnosis of chronic hypertension.  Initial evaluation in these women should include renal function by 24 hr urine for protein and creatinine clearance.  HTN is associated with Fetal Growth Restriction (FGR) in about 15-25% of cases. HTN progresses to superimposed preeclampsia in about 25-35% of cases, usually recognized by the appearance of proteinuria & worsening hypertension.  These interventions do not decrease the risk of superimposed preeclampsia: prophylactic bedrest, work & activity restrictions, dietary changes, nutrient supplements, or prophylactic  antihypertensive therapy.      The only proven prevention strategy for preeclampsia is low dose aspirin during pregnancy.   In fact, the USPTF recommends low dose aspirin initiation after 12 wks gestation for preeclampsia prevention in these women.  We have instructed patient to check a blood pressure log and to report BPs 160/110s. With recent publishing of the CHAP trial, the goal for mild chronic hypertension is blood pressures <140/80. This trial showed decreased maternal morbidity with no change in  morbidity.    Recommendations:  -Currently on low dose aspirin  -Baseline labs: 24 hour urine not yet completed, serum labs wnl  -Monthly growth ultrasounds     - testing at 32 wks unless indication develops prior (usually with twice weekly NST's)   - Delivery based on BP control/complications but ideally at term        3. Advanced maternal age, primigravida, antepartum  Assessment & Plan:  Patient will be 39 at time of delivery which classifies patient as advanced maternal age. Patient has previously had NIPT drawn and is no increased risk. Today we discussed that her risk of having a child with aneuploidy is now 1 in [id number]. We discussed the limitations of NIPT and that it is a screening testing only for chromosome 21, 13, 18 and sex chromosomes. Patient was offered definitive diagnostic testing with amniocentesis, but patient currently declines.        4. Alpha thalassemia silent carrier  Assessment & Plan:  Alpha-thalassemia is a genetic condition characterized by microcytic hypochromic anemia caused by mutations in the HBA1 and HBA2 genes that make the alpha-globin protein. Alpha-thalassemia occurs more frequently in individuals with Mediterranean, North , ,  and Central and Southeast  ethnicity.  It is inherited in an autosomal recessive pattern.    Alpha-globin is made by four genes, two on each strand of chromosome 16.  An individual with one abnormal ?-globin  gene is a silent carrier of alpha-thalassemia.  Individuals with alpha-thalassemia trait have a deletion in two of the four copies o the genes.  Alpha-thalassemia trait can usually be detected through routine blood work that reveals hypochromic red blood cells.  The two deletions could be inherited separately (i.e., on different chromosomes) or together (i.e., on one chromosome).  It is important to determine whether the two deletions are inherited together because that increases the risk of having a child with Hemoglobin H disease or Hemoglobin Dante syndrome depending on the carrier status of the partner.  HbH disease results from deletion or inactivation of three ?-globin genes, usually as a result of the compound heterozygous state for ?°-thalassemia and ?+-thalassemia.  The phenotype of HbH disease (chronic microcytic, hypochromic hemolytic anemia of variable severity) mainly correlates with the severity of the ?+-thalassemia defect.  Hb Dante syndrome results from deletion of four ?-globin chains and rarely may involve non-deletion defects.      Hemoglobin Dante hydrops fetalis (Hb Dante) syndrome, the most severe form of ?-thalassemia, is characterized by fetal onset of generalized edema, ascites, pleural and pericardial effusion, and severe hypochromic anemia, in the absence of ABO or Rh blood group incompatibility.  It is usually detected by ultrasonography at 22 to 28 weeks' gestation and can be suspected in an at-risk pregnancy at 13 to 14 weeks' gestation when increased nuchal thickness, possible placental thickness and increased cardiothoracic ratio are present.  Death in the  period is almost inevitable.      In general, no therapy is required for alpha-thalassemia minor but it is important to avoid excessive iron administration as the premature breakdown of red blood cells with iron administration could result in iron overload.  Patients with alpha-thalassemia, however, do benefit from folic acid  supplementation usually in the range of 1-2 mg per day.             Follow Up  4 weeks    I spent 30 minutes caring for the patient on the day of service. This included: obtaining or reviewing a separately obtained medical history, reviewing patient records, performing a medically appropriate exam and/or evaluation, counseling or educating the patient/family/caregiver, ordering medications, labs, and/or procedures and documenting such in the medical record. This does not include time spent on review and interpretation of other tests such as fetal ultrasound or the performance of other procedures such as amniocentesis or CVS.      Deric Collado MD, FACOG  Maternal Fetal Medicine, [facility] [place]

## 2025-04-28 NOTE — PROGRESS NOTES
Patient denies any leaking fluid, vaginal bleeding, or contractions.  NIPT low risk.  Patient reports next follow-up appointment with Dr. Martinez's office is today.

## 2025-04-28 NOTE — ASSESSMENT & PLAN NOTE
Pt has a diagnosis of chronic hypertension.  Initial evaluation in these women should include renal function by 24 hr urine for protein and creatinine clearance.  HTN is associated with Fetal Growth Restriction (FGR) in about 15-25% of cases. HTN progresses to superimposed preeclampsia in about 25-35% of cases, usually recognized by the appearance of proteinuria & worsening hypertension.  These interventions do not decrease the risk of superimposed preeclampsia: prophylactic bedrest, work & activity restrictions, dietary changes, nutrient supplements, or prophylactic antihypertensive therapy.      The only proven prevention strategy for preeclampsia is low dose aspirin during pregnancy.   In fact, the USPTF recommends low dose aspirin initiation after 12 wks gestation for preeclampsia prevention in these women.  We have instructed patient to check a blood pressure log and to report BPs 160/110s. With recent publishing of the CHAP trial, the goal for mild chronic hypertension is blood pressures <140/80. This trial showed decreased maternal morbidity with no change in  morbidity.    Recommendations:  -Currently on low dose aspirin  -Baseline labs: 24 hour urine not yet completed, serum labs wnl  -Monthly growth ultrasounds     - testing at 32 wks unless indication develops prior (usually with twice weekly NST's)   - Delivery based on BP control/complications but ideally at term

## 2025-04-28 NOTE — PROGRESS NOTES
Chief Complaint   Patient presents with    Routine Prenatal Visit     Ob visit     Pregnancy Ultrasound     PDC ultrasound        HPI: Alysia is a  currently at 19w1d who today reports the following:Headache - No ; Visual change - No ; Swelling in legs - No ; Nausea - No ; Constipation - No; Diarrhea - No ; Contractions - No ; Leaking fluid - No ; Vaginal bleeding -  No.      ROS: Pertinent items are noted in HPI.  Vitals: See prenatal flowsheet     EXAM:  Abdomen: See physician component of prenatal flowsheet   Urine glucose/protein: See physician component of prenatal flowsheet   Pelvic: See physician component of prenatal flowsheet     Prenatal Labs  Lab Results   Component Value Date    HGB 11.0 (L) 2025    RUBELLAABIGG 1.14 2025    HEPBSAG Non-Reactive 2025    ABO B 2025    RH Positive 2025    ABSCRN Negative 2025    NPK3QSA3 Non-Reactive 2025    HEPCVIRUSABY Non-Reactive 2025       MDM:Hig-Risk Pregnancy  Impression:supervision of high risk pregnancy, Multiparous patient with medical complications, Diabetes mellitus, Chronic hypertension, Tobacco Abuse, Obesity, and AMA  Tests done today: U/S for anatomic screening   Specific topics discussed at today's visit:  Schedule of  testing. Insulin dosing and administration. She has not begun insulin having not had access to syringes. She continues oral hypoglycemic medication with sub-optimal glycemic control She has Ophthalmology appt scheduled. She plans to complete the 24 hour urine collection previously scheduled  She continues low-dose ASA  We discussed  the rationale for MSAFP testing in pregnancy, declines  New Medications Ordered This Visit   Medications    Insulin Syringes, Disposable, U-100 1 ML misc     Sig: Use 1 syringe 4 (Four) Times a Day.     Dispense:  100 each     Refill:  1     Next visit: MFM scan for growth

## 2025-04-28 NOTE — ASSESSMENT & PLAN NOTE
Alpha-thalassemia is a genetic condition characterized by microcytic hypochromic anemia caused by mutations in the HBA1 and HBA2 genes that make the alpha-globin protein. Alpha-thalassemia occurs more frequently in individuals with Mediterranean, North , ,  and Central and Southeast  ethnicity.  It is inherited in an autosomal recessive pattern.    Alpha-globin is made by four genes, two on each strand of chromosome 16.  An individual with one abnormal ?-globin gene is a silent carrier of alpha-thalassemia.  Individuals with alpha-thalassemia trait have a deletion in two of the four copies o the genes.  Alpha-thalassemia trait can usually be detected through routine blood work that reveals hypochromic red blood cells.  The two deletions could be inherited separately (i.e., on different chromosomes) or together (i.e., on one chromosome).  It is important to determine whether the two deletions are inherited together because that increases the risk of having a child with Hemoglobin H disease or Hemoglobin Dante syndrome depending on the carrier status of the partner.  HbH disease results from deletion or inactivation of three ?-globin genes, usually as a result of the compound heterozygous state for ?°-thalassemia and ?+-thalassemia.  The phenotype of HbH disease (chronic microcytic, hypochromic hemolytic anemia of variable severity) mainly correlates with the severity of the ?+-thalassemia defect.  Hb Dante syndrome results from deletion of four ?-globin chains and rarely may involve non-deletion defects.      Hemoglobin Dante hydrops fetalis (Hb Dante) syndrome, the most severe form of ?-thalassemia, is characterized by fetal onset of generalized edema, ascites, pleural and pericardial effusion, and severe hypochromic anemia, in the absence of ABO or Rh blood group incompatibility.  It is usually detected by ultrasonography at 22 to 28 weeks' gestation and can be suspected in an at-risk  pregnancy at 13 to 14 weeks' gestation when increased nuchal thickness, possible placental thickness and increased cardiothoracic ratio are present.  Death in the  period is almost inevitable.      In general, no therapy is required for alpha-thalassemia minor but it is important to avoid excessive iron administration as the premature breakdown of red blood cells with iron administration could result in iron overload.  Patients with alpha-thalassemia, however, do benefit from folic acid supplementation usually in the range of 1-2 mg per day.

## 2025-04-28 NOTE — LETTER
2025     Armando Martinez MD  [address]  [address]  [facility] [address]    Patient: Alysia Lamas   YOB: 1985   Date of Visit: 2025     Dear Armando Martinez MD:       Thank you for referring Alysia Lamas to me for evaluation. Below are the relevant portions of my assessment and plan of care.    If you have questions, please do not hesitate to call me. I look forward to following Alysia along with you.         Sincerely,        Deric Collado MD        CC: No Recipients    Deric Collado MD  [phone]  Sign when Signing Visit      Maternal/Fetal Medicine Consult Note   Date: 2025  Name: Alysia Lamas    : 1985     MRN: [id number]     Referring Provider: Armando Martinez MD    Chief Complaint  T2DM; AMA; Smoke/vape; CHTN; MO    Subjective     History of Present Illness:  Alysia Lamas is a 39 y.o.  19w1d who presents today for AMA, chronic hypertension, type 2 diabetes, alpha thalassemia carrier    JUNE: Estimated Date of Delivery: 25     ROS:   Otherwise Noted in HPI    Past Medical History:   Diagnosis Date   • Anemia    • Chlamydia    • Diabetes    • Diabetes mellitus    • Gonorrhea    • Hypertension    • Substance use disorder     in remission. Sober since 2023   • Urinary tract infection    • Urogenital trichomoniasis       Past Surgical History:   Procedure Laterality Date   • FACIAL COSMETIC SURGERY        OB History          2    Para   1    Term   1            AB        Living   1         SAB        IAB        Ectopic        Molar        Multiple        Live Births   1                Current Outpatient Medications:   •  aspirin 81 MG EC tablet, Take 1 tablet by mouth Daily for 191 days., Disp: 90 tablet, Rfl: 1  •  Blood Glucose Monitoring Suppl (FreeStyle Freedom Lite) w/Device kit, 2 (Two) Times a Day. for testing, Disp: , Rfl:   •  Ferric Maltol (ACCRUFeR) 30 MG capsule, Take 1 capsule by mouth  "2 (Two) Times a Day Before Meals., Disp: 100 capsule, Rfl: 2  •  FREESTYLE LITE test strip, 2 (Two) Times a Day. for testing, Disp: , Rfl:   •  Januvia 25 MG tablet, Take 1 tablet by mouth Daily., Disp: , Rfl:   •  Lancets (freestyle) lancets, USE FOR TESTING TWICE A DAY, Disp: , Rfl:   •  metFORMIN (GLUCOPHAGE) 1000 MG tablet, Take 1 tablet by mouth 2 (Two) Times a Day With Meals., Disp: , Rfl:   •  NIFEdipine XL (Procardia XL) 60 MG 24 hr tablet, Take 1 tablet by mouth Daily., Disp: 90 tablet, Rfl: 2  •  pantoprazole (PROTONIX) 40 MG EC tablet, Take 1 tablet by mouth Daily., Disp: , Rfl:   •  Prenatal Vit-Fe Fumarate-FA (Prenatal Vitamin Plus Low Iron) 27-1 MG tablet, Take 1 tablet by mouth Daily., Disp: 100 tablet, Rfl: 3  •  amLODIPine (NORVASC) 5 MG tablet, , Disp: , Rfl:   •  Insulin Glargine (LANTUS SOLOSTAR) 100 UNIT/ML injection pen, Inject 10 Units under the skin into the appropriate area as directed Every Night. (Patient not taking: Reported on 2025), Disp: 15 mL, Rfl: 2  •  Ozempic, 2 MG/DOSE, 8 MG/3ML solution pen-injector, Inject 2 mg under the skin into the appropriate area as directed 1 (One) Time Per Week. (Patient not taking: Reported on 2025), Disp: , Rfl:     Objective     Vital Signs  /55   Wt 133 kg (293 lb)   LMP 12/15/2024   Estimated body mass index is 42.04 kg/m² as calculated from the following:    Height as of 24: 177.8 cm (70\").    Weight as of this encounter: 133 kg (293 lb).    Ultrasound Impression:   See Viewpoint     Assessment and Plan     Alysia Lamas is a 39 y.o.  19w1d who presents today for AMA, chronic hypertension, type 2 diabetes, alpha thalassemia carrier    Diagnoses and all orders for this visit:    1. Type 2 diabetes mellitus without complication, without long-term current use of insulin (Primary)  Assessment & Plan:  Pregestational diabetics have an 8-10% risk for having a child with a congenital malformation.  The risk is lower if the " patient has good glycemic control prior to conception. Women who have a normal hemoglobin A1C, have an incidence of birth defects comparable to the general population risk of 3-5%.  Women whose hemoglobin A1C is elevated have the highest risk for fetal malformations and the risk is proportional to the degree of elevation. The most frequent malformations found in infants of diabetic mothers are heart defects. The infants may also have neural tube defects, caudal regression, and limb abnormalities, including femoral hypoplasia.  A periconceptual A1C of >8.5% has been associated with a >20% risk for congenital anomalies.  When the HgbA1C is severely elevated there is also an increased risk of miscarriage.      Today we discussed the risks of diabetes and pregnancy including maternal risks: increasing insulin requirements, operative delivery, preeclampsia, and infection; as well as fetal risks: malformations as discussed above, growth abnormalities, iatrogenic  birth and stillbirth.   morbidities among infants of diabetic mothers include hypoglycemia, hyperbilirubinemia, and metabolic instability.     I have given her our goals for pregnancy glucose control of fasting glucose 95, 2 hr postprandial glucose <120, and 1 hr postprandial glucose <140.  She is going to keep track of these 4 values daily     Recommendations:  - Baseline pre-eclampsia evaluation including 24 hour urine protein. Aspirin supplementation for pre-eclampsia prevention.   - EKG, Opthalmologic evaluation.   - Detailed anatomy ultrasound at 20 weeks  - Fetal echocardiogram at 24-26 weeks  - Serial growth assessment (every 4 wks)  -  testing: to begin at 32 wks unless indication develops prior.  - Continue glucose log and adjust medications as needed.  - Delivery should be based on glycemic control with ideal delivery at 39 wks if well controlled and delivery prior necessitated by poor control.        2. Chronic hypertension  affecting pregnancy  Assessment & Plan:  Pt has a diagnosis of chronic hypertension.  Initial evaluation in these women should include renal function by 24 hr urine for protein and creatinine clearance.  HTN is associated with Fetal Growth Restriction (FGR) in about 15-25% of cases. HTN progresses to superimposed preeclampsia in about 25-35% of cases, usually recognized by the appearance of proteinuria & worsening hypertension.  These interventions do not decrease the risk of superimposed preeclampsia: prophylactic bedrest, work & activity restrictions, dietary changes, nutrient supplements, or prophylactic antihypertensive therapy.      The only proven prevention strategy for preeclampsia is low dose aspirin during pregnancy.   In fact, the USPTF recommends low dose aspirin initiation after 12 wks gestation for preeclampsia prevention in these women.  We have instructed patient to check a blood pressure log and to report BPs 160/110s. With recent publishing of the CHAP trial, the goal for mild chronic hypertension is blood pressures <140/80. This trial showed decreased maternal morbidity with no change in  morbidity.    Recommendations:  -Currently on low dose aspirin  -Baseline labs: 24 hour urine not yet completed, serum labs wnl  -Monthly growth ultrasounds     - testing at 32 wks unless indication develops prior (usually with twice weekly NST's)   - Delivery based on BP control/complications but ideally at term        3. Advanced maternal age, primigravida, antepartum  Assessment & Plan:  Patient will be 39 at time of delivery which classifies patient as advanced maternal age. Patient has previously had NIPT drawn and is no increased risk. Today we discussed that her risk of having a child with aneuploidy is now 1 in 39581. We discussed the limitations of NIPT and that it is a screening testing only for chromosome 21, 13, 18 and sex chromosomes. Patient was offered definitive diagnostic testing  with amniocentesis, but patient currently declines.        4. Alpha thalassemia silent carrier  Assessment & Plan:  Alpha-thalassemia is a genetic condition characterized by microcytic hypochromic anemia caused by mutations in the HBA1 and HBA2 genes that make the alpha-globin protein. Alpha-thalassemia occurs more frequently in individuals with Mediterranean, North , ,  and Central and Southeast  ethnicity.  It is inherited in an autosomal recessive pattern.    Alpha-globin is made by four genes, two on each strand of chromosome 16.  An individual with one abnormal a-globin gene is a silent carrier of alpha-thalassemia.  Individuals with alpha-thalassemia trait have a deletion in two of the four copies o the genes.  Alpha-thalassemia trait can usually be detected through routine blood work that reveals hypochromic red blood cells.  The two deletions could be inherited separately (i.e., on different chromosomes) or together (i.e., on one chromosome).  It is important to determine whether the two deletions are inherited together because that increases the risk of having a child with Hemoglobin H disease or Hemoglobin Dante syndrome depending on the carrier status of the partner.  HbH disease results from deletion or inactivation of three a-globin genes, usually as a result of the compound heterozygous state for a°-thalassemia and a+-thalassemia.  The phenotype of HbH disease (chronic microcytic, hypochromic hemolytic anemia of variable severity) mainly correlates with the severity of the a+-thalassemia defect.  Hb Dante syndrome results from deletion of four a-globin chains and rarely may involve non-deletion defects.      Hemoglobin Dante hydrops fetalis (Hb Dante) syndrome, the most severe form of a-thalassemia, is characterized by fetal onset of generalized edema, ascites, pleural and pericardial effusion, and severe hypochromic anemia, in the absence of ABO or Rh blood group  incompatibility.  It is usually detected by ultrasonography at 22 to 28 weeks' gestation and can be suspected in an at-risk pregnancy at 13 to 14 weeks' gestation when increased nuchal thickness, possible placental thickness and increased cardiothoracic ratio are present.  Death in the  period is almost inevitable.      In general, no therapy is required for alpha-thalassemia minor but it is important to avoid excessive iron administration as the premature breakdown of red blood cells with iron administration could result in iron overload.  Patients with alpha-thalassemia, however, do benefit from folic acid supplementation usually in the range of 1-2 mg per day.             Follow Up  4 weeks    I spent 30 minutes caring for the patient on the day of service. This included: obtaining or reviewing a separately obtained medical history, reviewing patient records, performing a medically appropriate exam and/or evaluation, counseling or educating the patient/family/caregiver, ordering medications, labs, and/or procedures and documenting such in the medical record. This does not include time spent on review and interpretation of other tests such as fetal ultrasound or the performance of other procedures such as amniocentesis or CVS.      Deric Collado MD, FACOG  Maternal Fetal Medicine, [facility] [place]

## 2025-05-12 ENCOUNTER — TELEPHONE (OUTPATIENT)
Dept: OBSTETRICS AND GYNECOLOGY | Facility: HOSPITAL | Age: 40
End: 2025-05-12
Payer: COMMERCIAL

## 2025-05-12 NOTE — TELEPHONE ENCOUNTER
Attempted to reach patient by phone.  Unable to leave message due to patient not having a voice mailbox set up.  Will send [employer] message.  Angela Caputo RN

## 2025-05-14 ENCOUNTER — PATIENT OUTREACH (OUTPATIENT)
Dept: LABOR AND DELIVERY | Facility: HOSPITAL | Age: 40
End: 2025-05-14
Payer: COMMERCIAL

## 2025-05-14 NOTE — OUTREACH NOTE
[facility]  Unable to Reach    Questions/Answers      Flowsheet Row Responses   Pending Outreach Prenatal Check-in   Call Attempt First   Outcome No answer/busy   Next Call Attempt Date 05/16/25            Contact info provided, encouraged to call if she has any questions, concerns, or needs assistance with resources.    ANTON Mercado RN  Maternity Nurse Navigator    5/14/2025, 08:59 EDT

## 2025-05-16 ENCOUNTER — PATIENT OUTREACH (OUTPATIENT)
Dept: LABOR AND DELIVERY | Facility: HOSPITAL | Age: 40
End: 2025-05-16
Payer: COMMERCIAL

## 2025-05-16 NOTE — OUTREACH NOTE
[facility]  Unable to Reach    Questions/Answers      Flowsheet Row Responses   Pending Outreach Prenatal Check-in   Call Attempt Second   Outcome No answer/busy, Left message   Next Call Attempt Date 05/29/25              ANTON Mercado RN  Maternity Nurse Navigator    5/16/2025, 08:18 EDT

## 2025-05-19 ENCOUNTER — MEDICATION THERAPY MANAGEMENT (OUTPATIENT)
Dept: OBSTETRICS AND GYNECOLOGY | Facility: HOSPITAL | Age: 40
End: 2025-05-19
Payer: COMMERCIAL

## 2025-05-19 ENCOUNTER — TELEPHONE (OUTPATIENT)
Dept: OBSTETRICS AND GYNECOLOGY | Facility: HOSPITAL | Age: 40
End: 2025-05-19
Payer: COMMERCIAL

## 2025-05-19 RX ORDER — BLOOD-GLUCOSE METER
1 KIT MISCELLANEOUS AS NEEDED
Qty: 1 EACH | Refills: 0 | Status: SHIPPED | OUTPATIENT
Start: 2025-05-19

## 2025-05-19 RX ORDER — LANCETS 23 GAUGE
EACH MISCELLANEOUS
Qty: 100 EACH | Refills: 12 | Status: SHIPPED | OUTPATIENT
Start: 2025-05-19

## 2025-05-19 NOTE — TELEPHONE ENCOUNTER
Spoke with patient over the phone.  Requested patient send in a blood sugar log.  Patient states she is wearing her Dexcom.  I had tried to reach out by phone and [employer] previously regarding this and never received a response.  Patient entered in our clinic code per my instructions over the phone and I can now see her data.  Informed patient to watch for a [employer] message or phone call from me on Wednesday or Thursday based on review of her Dexcom report.  Patient voices understanding.  Patient also requesting new glucometer.  Patient states she last received on in Jan 2024.  Informed patient that insurance may not cover another one but I would send in the order for it.  Confirmed that [facility] [place].  Angela Caputo RN

## 2025-05-21 ENCOUNTER — TELEPHONE (OUTPATIENT)
Dept: OBSTETRICS AND GYNECOLOGY | Facility: HOSPITAL | Age: 40
End: 2025-05-21
Payer: COMMERCIAL

## 2025-05-21 ENCOUNTER — MEDICATION THERAPY MANAGEMENT (OUTPATIENT)
Dept: OBSTETRICS AND GYNECOLOGY | Facility: HOSPITAL | Age: 40
End: 2025-05-21
Payer: COMMERCIAL

## 2025-05-21 DIAGNOSIS — E11.9 TYPE 2 DIABETES MELLITUS WITHOUT COMPLICATION, WITHOUT LONG-TERM CURRENT USE OF INSULIN: Primary | ICD-10-CM

## 2025-05-21 NOTE — TELEPHONE ENCOUNTER
Spoke with patient over the phone.  Patient states she is not taking the Januvia and is taking the Metformin 1000mg BID and Lantus 10 units at night.  Emphasized to patient she should not take Januvia during pregnancy.  Also informed patient that Dr. Lobo will be reviewing her blood sugar log now that we have clarified her Diabetic medications and I will send her a detailed [employer] message.  Patient voices understanding.  Angela Caputo RN

## 2025-05-27 ENCOUNTER — TELEPHONE (OUTPATIENT)
Dept: OBSTETRICS AND GYNECOLOGY | Facility: HOSPITAL | Age: 40
End: 2025-05-27
Payer: COMMERCIAL

## 2025-05-27 ENCOUNTER — PATIENT OUTREACH (OUTPATIENT)
Dept: LABOR AND DELIVERY | Facility: HOSPITAL | Age: 40
End: 2025-05-27
Payer: COMMERCIAL

## 2025-05-27 ENCOUNTER — TELEPHONE (OUTPATIENT)
Dept: OBSTETRICS AND GYNECOLOGY | Facility: CLINIC | Age: 40
End: 2025-05-27
Payer: COMMERCIAL

## 2025-05-27 NOTE — TELEPHONE ENCOUNTER
Attempted to reach patient by phone.  Left message stating a detailed [email or web address] message is being sent and to please respond to that.  If she has questions, please call the office at [phone].  Angela Caputo RN

## 2025-05-27 NOTE — OUTREACH NOTE
[facility]  Unable to Reach    Questions/Answers      Flowsheet Row Responses   Pending Outreach Prenatal Check-in   Call Attempt Third   Outcome No answer/busy, Left message   Next Call Attempt Date 06/30/25              ANTON Mercado RN  Maternity Nurse Navigator    5/27/2025, 08:28 EDT

## 2025-06-02 ENCOUNTER — OFFICE VISIT (OUTPATIENT)
Dept: OBSTETRICS AND GYNECOLOGY | Facility: HOSPITAL | Age: 40
End: 2025-06-02
Payer: COMMERCIAL

## 2025-06-02 ENCOUNTER — HOSPITAL ENCOUNTER (OUTPATIENT)
Dept: WOMENS IMAGING | Facility: HOSPITAL | Age: 40
Discharge: HOME | End: 2025-06-02
Admitting: OBSTETRICS & GYNECOLOGY
Payer: COMMERCIAL

## 2025-06-02 VITALS — SYSTOLIC BLOOD PRESSURE: 80 MMHG | DIASTOLIC BLOOD PRESSURE: 54 MMHG | BODY MASS INDEX: 40.89 KG/M2 | WEIGHT: 285 LBS

## 2025-06-02 DIAGNOSIS — E11.9 TYPE 2 DIABETES MELLITUS WITHOUT COMPLICATION, WITHOUT LONG-TERM CURRENT USE OF INSULIN: Primary | ICD-10-CM

## 2025-06-02 DIAGNOSIS — O10.919 CHRONIC HYPERTENSION AFFECTING PREGNANCY: ICD-10-CM

## 2025-06-02 PROBLEM — Z3A.19 19 WEEKS GESTATION OF PREGNANCY: Status: RESOLVED | Noted: 2025-03-14 | Resolved: 2025-06-02

## 2025-06-02 PROBLEM — O24.919 DIABETES IN PREGNANCY: Status: ACTIVE | Noted: 2025-06-02

## 2025-06-02 PROCEDURE — 76816 OB US FOLLOW-UP PER FETUS: CPT

## 2025-06-02 PROCEDURE — 76825 ECHO EXAM OF FETAL HEART: CPT

## 2025-06-02 PROCEDURE — 93325 DOPPLER ECHO COLOR FLOW MAPG: CPT

## 2025-06-02 RX ORDER — ACYCLOVIR 400 MG/1
TABLET ORAL
COMMUNITY
Start: 2025-05-25

## 2025-06-02 RX ORDER — ACYCLOVIR 400 MG/1
TABLET ORAL
COMMUNITY
Start: 2025-04-29

## 2025-06-02 RX ORDER — ERGOCALCIFEROL 1.25 MG/1
1 CAPSULE, LIQUID FILLED ORAL WEEKLY
COMMUNITY
Start: 2025-05-29

## 2025-06-02 NOTE — PROGRESS NOTES
Patient denies any leaking of fluid, vaginal bleeding, or contractions.  NIPT low risk.  Patient reports next follow-up appointment with Dr. Martinez's office is today.

## 2025-06-02 NOTE — PROGRESS NOTES
Maternal/Fetal Medicine Consult Note   Date: 2025  Name: Alysia Lamas    : 1985     MRN: [id number]     Referring Provider: Armando Martinez MD    Chief Complaint  T2DM; AMA; CHTN; MO; Atlphathal carrier    Subjective     History of Present Illness:  Alysia Lamas is a 39 y.o.  24w1d who presents today for diabetes and chronic hypertension    JUNE: Estimated Date of Delivery: 25     ROS:   Otherwise Noted in HPI      Current Outpatient Medications:     aspirin 81 MG EC tablet, Take 1 tablet by mouth Daily for 191 days., Disp: 90 tablet, Rfl: 1    Blood Glucose Monitoring Suppl (FreeStyle Freedom Lite) w/Device kit, 2 (Two) Times a Day. for testing, Disp: , Rfl:     Continuous Glucose  (Dexcom G7 ) device, USE 1 DEVICE 1 (ONE) TIME FOR 1 DOSE., Disp: , Rfl:     Continuous Glucose Sensor (Dexcom G7 Sensor) misc, CHANGE EVERY 10 DAYS, Disp: , Rfl:     Ferric Maltol (ACCRUFeR) 30 MG capsule, Take 1 capsule by mouth 2 (Two) Times a Day Before Meals., Disp: 100 capsule, Rfl: 2    FREESTYLE LITE test strip, 2 (Two) Times a Day. for testing, Disp: , Rfl:     glucose blood test strip, Use as instructed up to 4 time daily as needed, Disp: 100 each, Rfl: 6    glucose monitor monitoring kit, Use 1 each As Needed (use as directed up to 4 times daily)., Disp: 1 each, Rfl: 0    Insulin Glargine (LANTUS SOLOSTAR) 100 UNIT/ML injection pen, Inject 12 Units under the skin into the appropriate area as directed Every Night., Disp: 15 mL, Rfl: 2    Insulin Pen Needle (Pen Needles) 31G X 5 MM misc, Use 1 each 4 (Four) Times a Day., Disp: 100 each, Rfl: 2    Insulin Syringes, Disposable, U-100 1 ML misc, Use 1 syringe 4 (Four) Times a Day., Disp: 100 each, Rfl: 1    Lancets (accu-chek safe-t pro) lancets, Use as directed up to 4 times daily as needed, Disp: 100 each, Rfl: 12    Lancets (freestyle) lancets, USE FOR TESTING TWICE A DAY, Disp: , Rfl:     metFORMIN (GLUCOPHAGE) 1000 MG  "tablet, Take 1 tablet by mouth 2 (Two) Times a Day With Meals., Disp: , Rfl:     NIFEdipine XL (Procardia XL) 60 MG 24 hr tablet, Take 1 tablet by mouth Daily., Disp: 90 tablet, Rfl: 2    pantoprazole (PROTONIX) 40 MG EC tablet, Take 1 tablet by mouth Daily., Disp: , Rfl:     Prenatal Vit-Fe Fumarate-FA (Prenatal Vitamin Plus Low Iron) 27-1 MG tablet, Take 1 tablet by mouth Daily., Disp: 100 tablet, Rfl: 3    vitamin D (ERGOCALCIFEROL) 1.25 MG (35024 UT) capsule capsule, Take 1 capsule by mouth 1 (One) Time Per Week., Disp: , Rfl:     Januvia 25 MG tablet, Take 1 tablet by mouth Daily. (Patient not taking: Reported on 2025), Disp: , Rfl:     Objective     Vital Signs  BP (!) 80/54   Wt 129 kg (285 lb)   LMP 12/15/2024   Estimated body mass index is 40.89 kg/m² as calculated from the following:    Height as of 24: 177.8 cm (70\").    Weight as of this encounter: 129 kg (285 lb).    Ultrasound Impression:   See Viewpoint     Assessment and Plan     Alysia Lamas is a 39 y.o.  24w1d who presents today for diabetes and chronic hypertension    Diagnoses and all orders for this visit:    1. Type 2 diabetes mellitus without complication, without long-term current use of insulin (Primary)  Assessment & Plan:  Patient presents for follow-up growth ultrasound secondary to type 2 diabetes.  Diabetes is currently being managed here with weekly glucose review.  We discussed increased risk of fetal cardiac abnormalities associated with type 2 diabetes.  We discussed the limitations of fetal echo including diagnosing valvular abnormalities and small VSDs.      2. Chronic hypertension affecting pregnancy  Assessment & Plan:  Currently on 60 mg of Procardia daily.  First blood pressure today 80/54 and on repeat was 91/44.  Given relative hypotension would recommend decreasing Procardia to 30 mg daily with close interval follow-up of blood pressures.           Follow Up  4 weeks    I spent 10 minutes caring for " the patient on the day of service. This included: obtaining or reviewing a separately obtained medical history, reviewing patient records, performing a medically appropriate exam and/or evaluation, counseling or educating the patient/family/caregiver, ordering medications, labs, and/or procedures and documenting such in the medical record. This does not include time spent on review and interpretation of other tests such as fetal ultrasound or the performance of other procedures such as amniocentesis or CVS.      Deric Collado MD, FACOG  Maternal Fetal Medicine, [facility] [place]

## 2025-06-02 NOTE — LETTER
2025     Armando Martinez MD  [address]  [address]  [facility] [address]    Patient: Alysia Lamas   YOB: 1985   Date of Visit: 2025     Dear Armando Martinez MD:       Thank you for referring Alysia Lamas to me for evaluation. Below are the relevant portions of my assessment and plan of care.    If you have questions, please do not hesitate to call me. I look forward to following Alysia along with you.         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  24w1d who presents today for diabetes and chronic hypertension    JUNE: Estimated Date of Delivery: 25     ROS:   Otherwise Noted in HPI      Current Outpatient Medications:   •  aspirin 81 MG EC tablet, Take 1 tablet by mouth Daily for 191 days., Disp: 90 tablet, Rfl: 1  •  Blood Glucose Monitoring Suppl (FreeStyle Freedom Lite) w/Device kit, 2 (Two) Times a Day. for testing, Disp: , Rfl:   •  Continuous Glucose  (Dexcom G7 ) device, USE 1 DEVICE 1 (ONE) TIME FOR 1 DOSE., Disp: , Rfl:   •  Continuous Glucose Sensor (Dexcom G7 Sensor) misc, CHANGE EVERY 10 DAYS, Disp: , Rfl:   •  Ferric Maltol (ACCRUFeR) 30 MG capsule, Take 1 capsule by mouth 2 (Two) Times a Day Before Meals., Disp: 100 capsule, Rfl: 2  •  FREESTYLE LITE test strip, 2 (Two) Times a Day. for testing, Disp: , Rfl:   •  glucose blood test strip, Use as instructed up to 4 time daily as needed, Disp: 100 each, Rfl: 6  •  glucose monitor monitoring kit, Use 1 each As Needed (use as directed up to 4 times daily)., Disp: 1 each, Rfl: 0  •  Insulin  "Glargine (LANTUS SOLOSTAR) 100 UNIT/ML injection pen, Inject 12 Units under the skin into the appropriate area as directed Every Night., Disp: 15 mL, Rfl: 2  •  Insulin Pen Needle (Pen Needles) 31G X 5 MM misc, Use 1 each 4 (Four) Times a Day., Disp: 100 each, Rfl: 2  •  Insulin Syringes, Disposable, U-100 1 ML misc, Use 1 syringe 4 (Four) Times a Day., Disp: 100 each, Rfl: 1  •  Lancets (accu-chek safe-t pro) lancets, Use as directed up to 4 times daily as needed, Disp: 100 each, Rfl: 12  •  Lancets (freestyle) lancets, USE FOR TESTING TWICE A DAY, Disp: , Rfl:   •  metFORMIN (GLUCOPHAGE) 1000 MG tablet, Take 1 tablet by mouth 2 (Two) Times a Day With Meals., Disp: , Rfl:   •  NIFEdipine XL (Procardia XL) 60 MG 24 hr tablet, Take 1 tablet by mouth Daily., Disp: 90 tablet, Rfl: 2  •  pantoprazole (PROTONIX) 40 MG EC tablet, Take 1 tablet by mouth Daily., Disp: , Rfl:   •  Prenatal Vit-Fe Fumarate-FA (Prenatal Vitamin Plus Low Iron) 27-1 MG tablet, Take 1 tablet by mouth Daily., Disp: 100 tablet, Rfl: 3  •  vitamin D (ERGOCALCIFEROL) 1.25 MG (19878 UT) capsule capsule, Take 1 capsule by mouth 1 (One) Time Per Week., Disp: , Rfl:   •  Januvia 25 MG tablet, Take 1 tablet by mouth Daily. (Patient not taking: Reported on 2025), Disp: , Rfl:     Objective     Vital Signs  BP (!) 80/54   Wt 129 kg (285 lb)   LMP 12/15/2024   Estimated body mass index is 40.89 kg/m² as calculated from the following:    Height as of 24: 177.8 cm (70\").    Weight as of this encounter: 129 kg (285 lb).    Ultrasound Impression:   See Viewpoint     Assessment and Plan     Alysia Lamas is a 39 y.o.  24w1d who presents today for diabetes and chronic hypertension    Diagnoses and all orders for this visit:    1. Type 2 diabetes mellitus without complication, without long-term current use of insulin (Primary)  Assessment & Plan:  Patient presents for follow-up growth ultrasound secondary to type 2 diabetes.  Diabetes is " currently being managed here with weekly glucose review.  We discussed increased risk of fetal cardiac abnormalities associated with type 2 diabetes.  We discussed the limitations of fetal echo including diagnosing valvular abnormalities and small VSDs.      2. Chronic hypertension affecting pregnancy  Assessment & Plan:  Currently on 60 mg of Procardia daily.  First blood pressure today 80/54 and on repeat was 91/44.  Given relative hypotension would recommend decreasing Procardia to 30 mg daily with close interval follow-up of blood pressures.           Follow Up  4 weeks    I spent 10 minutes caring for the patient on the day of service. This included: obtaining or reviewing a separately obtained medical history, reviewing patient records, performing a medically appropriate exam and/or evaluation, counseling or educating the patient/family/caregiver, ordering medications, labs, and/or procedures and documenting such in the medical record. This does not include time spent on review and interpretation of other tests such as fetal ultrasound or the performance of other procedures such as amniocentesis or CVS.      Deric Collado MD, FACOG  Maternal Fetal Medicine, [facility]

## 2025-06-02 NOTE — ASSESSMENT & PLAN NOTE
Currently on 60 mg of Procardia daily.  First blood pressure today 80/54 and on repeat was 91/44.  Given relative hypotension would recommend decreasing Procardia to 30 mg daily with close interval follow-up of blood pressures.

## 2025-06-02 NOTE — ASSESSMENT & PLAN NOTE
Patient presents for follow-up growth ultrasound secondary to type 2 diabetes.  Diabetes is currently being managed here with weekly glucose review.  We discussed increased risk of fetal cardiac abnormalities associated with type 2 diabetes.  We discussed the limitations of fetal echo including diagnosing valvular abnormalities and small VSDs.

## 2025-06-26 ENCOUNTER — MEDICATION THERAPY MANAGEMENT (OUTPATIENT)
Dept: OBSTETRICS AND GYNECOLOGY | Facility: HOSPITAL | Age: 40
End: 2025-06-26
Payer: COMMERCIAL

## 2025-06-26 DIAGNOSIS — E11.9 TYPE 2 DIABETES MELLITUS WITHOUT COMPLICATION, WITHOUT LONG-TERM CURRENT USE OF INSULIN: ICD-10-CM

## 2025-06-26 RX ORDER — INSULIN LISPRO 100 [IU]/ML
4 INJECTION, SOLUTION INTRAVENOUS; SUBCUTANEOUS
Qty: 15 ML | Refills: 0 | Status: SHIPPED | OUTPATIENT
Start: 2025-06-26

## 2025-07-01 ENCOUNTER — HOSPITAL ENCOUNTER (OUTPATIENT)
Dept: WOMENS IMAGING | Facility: HOSPITAL | Age: 40
Discharge: HOME | End: 2025-07-01
Admitting: OBSTETRICS & GYNECOLOGY
Payer: COMMERCIAL

## 2025-07-01 ENCOUNTER — ROUTINE PRENATAL (OUTPATIENT)
Dept: OBSTETRICS AND GYNECOLOGY | Facility: CLINIC | Age: 40
End: 2025-07-01
Payer: COMMERCIAL

## 2025-07-01 ENCOUNTER — OFFICE VISIT (OUTPATIENT)
Dept: OBSTETRICS AND GYNECOLOGY | Facility: HOSPITAL | Age: 40
End: 2025-07-01
Payer: COMMERCIAL

## 2025-07-01 VITALS — WEIGHT: 284.8 LBS | DIASTOLIC BLOOD PRESSURE: 67 MMHG | SYSTOLIC BLOOD PRESSURE: 127 MMHG | BODY MASS INDEX: 40.86 KG/M2

## 2025-07-01 VITALS — BODY MASS INDEX: 40.75 KG/M2 | WEIGHT: 284 LBS | DIASTOLIC BLOOD PRESSURE: 67 MMHG | SYSTOLIC BLOOD PRESSURE: 127 MMHG

## 2025-07-01 DIAGNOSIS — O10.919 CHRONIC HYPERTENSION AFFECTING PREGNANCY: ICD-10-CM

## 2025-07-01 DIAGNOSIS — F19.11 HISTORY OF SUBSTANCE ABUSE: ICD-10-CM

## 2025-07-01 DIAGNOSIS — O99.019 MATERNAL ANEMIA IN PREGNANCY, ANTEPARTUM: ICD-10-CM

## 2025-07-01 DIAGNOSIS — O09.90 SUPERVISION OF HIGH RISK PREGNANCY, ANTEPARTUM: ICD-10-CM

## 2025-07-01 DIAGNOSIS — O99.210 OBESITY IN PREGNANCY, ANTEPARTUM: ICD-10-CM

## 2025-07-01 DIAGNOSIS — O09.519 ADVANCED MATERNAL AGE, PRIMIGRAVIDA, ANTEPARTUM: ICD-10-CM

## 2025-07-01 DIAGNOSIS — D56.3 ALPHA THALASSEMIA SILENT CARRIER: ICD-10-CM

## 2025-07-01 DIAGNOSIS — Z34.90 PREGNANCY, UNSPECIFIED GESTATIONAL AGE: ICD-10-CM

## 2025-07-01 DIAGNOSIS — Z3A.28 28 WEEKS GESTATION OF PREGNANCY: ICD-10-CM

## 2025-07-01 DIAGNOSIS — F17.200 SMOKER: ICD-10-CM

## 2025-07-01 DIAGNOSIS — E66.01 MORBID OBESITY WITH BMI OF 40.0-44.9, ADULT: ICD-10-CM

## 2025-07-01 DIAGNOSIS — E11.9 TYPE 2 DIABETES MELLITUS WITHOUT COMPLICATION, UNSPECIFIED WHETHER LONG TERM INSULIN USE: Primary | ICD-10-CM

## 2025-07-01 PROCEDURE — 76816 OB US FOLLOW-UP PER FETUS: CPT

## 2025-07-01 PROCEDURE — 76819 FETAL BIOPHYS PROFIL W/O NST: CPT

## 2025-07-01 NOTE — PROGRESS NOTES
Patient denies any leaking of fluid or vaginal bleeding. She reports occasional johana rousseau contractions.  NIPT negative.  Patient reports next follow-up appointment with Dr. Martinez's office is today.

## 2025-07-01 NOTE — PROGRESS NOTES
Chief Complaint   Patient presents with    Routine Prenatal Visit     Ob visit     Pregnancy Ultrasound     PDC ultrasound        HPI: Alysia is a  currently at 28w2d who today reports the following:Headache - No ; Visual change - No ; Swelling in legs - No ; Nausea - No ; Constipation - No; Diarrhea - No ; Contractions - No ; Leaking fluid - No ; Vaginal bleeding -  No.      ROS: Pertinent items are noted in HPI.  Vitals: See prenatal flowsheet     EXAM:  Abdomen: See physician component of prenatal flowsheet   Urine glucose/protein: See physician component of prenatal flowsheet   Pelvic: See physician component of prenatal flowsheet     Prenatal Labs  Lab Results   Component Value Date    HGB 11.0 (L) 2025    RUBELLAABIGG 1.14 2025    HEPBSAG Non-Reactive 2025    ABO B 2025    RH Positive 2025    ABSCRN Negative 2025    AQH9WWM5 Non-Reactive 2025    HEPCVIRUSABY Non-Reactive 2025       MDM:  Impression:supervision of high risk pregnancy, Nulliparous patient with medical complications, Diabetes mellitus, Chronic hypertension, Tobacco Abuse, and AMA  Tests done today: U/S to complete the anatomic screening   Specific topics discussed at today's visit: Maternal monitoring of fetal movement   labor signs and symptoms  Symptoms of preeclampsia  Tobacco use  Schedule of  testing Management of CHTN vs CHTN with superimposed GHTN/preeclampsia. Need for preprandial insulin.Glycemic control goals.   No orders of the defined types were placed in this encounter.    Next visit:none Fplloe up MFM in 4 weeks followed by twice weekly  testing Confirm delivery plan ? 38 weeks

## 2025-07-01 NOTE — ASSESSMENT & PLAN NOTE
Patient returns today for pregnancy complicated by type 2 diabetes advanced her Orville and chronic hypertension.  Patient's blood pressure today is 127/67.  Patient is taking Procardia XL 30 mg daily.  Patient notes no complications and notes good fetal movement.    Ultrasound today demonstrates a normally grown fetus with no abnormality seen.  Amniotic fluid volume and umbilical artery Dopplers are normal.    Patient's blood pressure appears very under very good control on a single dose of Procardia XL.  We will not recommend any changes to her medication at this point.  Owing both to her diabetes and to her chronic hypertension we would recommend  testing beginning at 32 weeks gestation in Dr. Martinez's office.    Patient was counseled extensively regarding fetal movement and will contact her provider immediately if she notices any decreased fetal movement.

## 2025-07-01 NOTE — ASSESSMENT & PLAN NOTE
Patient returns today for pregnancy complicated by type 2 diabetes.  Review of her continuous glucose monitor demonstrates reasonable control with 81% of sugars within range 19% high.  None of her sugars were exceptionally high.  Her elevated sugars tend to be postprandial with her fastings being in a good range.  Patient notes no complications since her last visit and notes good fetal movement.    Ultrasound today demonstrates a normally grown fetus with no abnormality seen.  Amniotic fluid volume and umbilical artery Dopplers are normal.  Fetus is very active.    Patient was given a prescription for Humalog 4 units 3 times daily before meals.  Patient reports that she has not picked up this prescription yet.  We discussed the importance of glucose control to both her into the fetus.  Patient plans on obtaining the prescription in the near future and starting the short acting insulin.    We would recommend  testing beginning at 32 weeks gestation.  Patient was counseled extensively regarding fetal movement will contact her provider immediately if she notices any decreased fetal movement.

## 2025-07-01 NOTE — PROGRESS NOTES
"Documentation of the ultrasound findings, images, and interpretations will be available in the patient's Viewpoint report which is located in the imaging tab in chart review.    Maternal/Fetal Medicine Follow Up  Note     Name: Alysia Lamas    : 1985     MRN: [id number]     Referring Provider: Armando Martinez MD    Chief Complaint  T2DM; CHTN; AMA; MO; alpha-thalassemia silent carrier; vapes    Subjective     History of Present Illness:  Alysia Lamas is a 39 y.o.  28w2d who presents today for diabetes    JUNE: Estimated Date of Delivery: 25     ROS:   As noted in HPI.     Objective     Vital Signs  /67   Wt 129 kg (284 lb 12.8 oz)   LMP 12/15/2024   Estimated body mass index is 40.86 kg/m² as calculated from the following:    Height as of 24: 177.8 cm (70\").    Weight as of this encounter: 129 kg (284 lb 12.8 oz).    Physical Exam    Ultrasound Impression:   See Viewpoint    Assessment and Plan     Alysia Lamas is a 39 y.o.  28w2d who presents today for diabetes    Diagnoses and all orders for this visit:    1. Type 2 diabetes mellitus without complication, unspecified whether long term insulin use (Primary)  Assessment & Plan:  Patient returns today for pregnancy complicated by type 2 diabetes.  Review of her continuous glucose monitor demonstrates reasonable control with 81% of sugars within range 19% high.  None of her sugars were exceptionally high.  Her elevated sugars tend to be postprandial with her fastings being in a good range.  Patient notes no complications since her last visit and notes good fetal movement.    Ultrasound today demonstrates a normally grown fetus with no abnormality seen.  Amniotic fluid volume and umbilical artery Dopplers are normal.  Fetus is very active.    Patient was given a prescription for Humalog 4 units 3 times daily before meals.  Patient reports that she has not picked up this prescription yet.  We discussed the " importance of glucose control to both her into the fetus.  Patient plans on obtaining the prescription in the near future and starting the short acting insulin.    We would recommend  testing beginning at 32 weeks gestation.  Patient was counseled extensively regarding fetal movement will contact her provider immediately if she notices any decreased fetal movement.    Orders:  -     [facility]; Future    2. Chronic hypertension affecting pregnancy  Assessment & Plan:  Patient returns today for pregnancy complicated by type 2 diabetes advanced her Orville and chronic hypertension.  Patient's blood pressure today is 127/67.  Patient is taking Procardia XL 30 mg daily.  Patient notes no complications and notes good fetal movement.    Ultrasound today demonstrates a normally grown fetus with no abnormality seen.  Amniotic fluid volume and umbilical artery Dopplers are normal.    Patient's blood pressure appears very under very good control on a single dose of Procardia XL.  We will not recommend any changes to her medication at this point.  Owing both to her diabetes and to her chronic hypertension we would recommend  testing beginning at 32 weeks gestation in Dr. Martinez's office.    Patient was counseled extensively regarding fetal movement and will contact her provider immediately if she notices any decreased fetal movement.    Orders:  -      [facility]; Future    3. Morbid obesity with BMI of 40.0-44.9, adult  -      [facility]; Future    4. Pregnancy, unspecified gestational age  -      [facility]; Future         Follow Up  Return in about 4 weeks (around 2025).    I spent 20 minutes caring for the patient on the day of service. This included: obtaining or reviewing a separately obtained medical history, reviewing patient records, performing a medically appropriate exam and/or evaluation, counseling or  educating the patient/family/caregiver, ordering medications, labs, and/or procedures and documenting such in the medical record. This does not include time spent on review and interpretation of other tests such as fetal ultrasound or the performance of other procedures such as amniocentesis or CVS.      Douglas A. Milligan, MD  Maternal Fetal Medicine, [facility]     2025

## 2025-07-01 NOTE — LETTER
"2025     Armando Martinez MD  [address]  [address]  [facility] [address]    Patient: Alysia Lamas   YOB: 1985   Date of Visit: 2025     Dear Armando Martinez MD:       Thank you for referring Alysia Lamas to me for evaluation. Below are the relevant portions of my assessment and plan of care.    If you have questions, please do not hesitate to call me. I look forward to following Alysia along with you.         Sincerely,        Douglas A. Milligan, MD        CC: No Recipients    Milligan, Douglas A, MD  25 [phone]  Sign when Signing Visit  Documentation of the ultrasound findings, images, and interpretations will be available in the patient's Viewpoint report which is located in the imaging tab in chart review.    Maternal/Fetal Medicine Follow Up  Note     Name: Alysia Lamas    : 1985     MRN: [id number]     Referring Provider: Armando Martinez MD    Chief Complaint  T2DM; CHTN; AMA; MO; alpha-thalassemia silent carrier; vapes    Subjective     History of Present Illness:  Alysia Lamas is a 39 y.o.  28w2d who presents today for diabetes    JUNE: Estimated Date of Delivery: 25     ROS:   As noted in HPI.     Objective     Vital Signs  /67   Wt 129 kg (284 lb 12.8 oz)   LMP 12/15/2024   Estimated body mass index is 40.86 kg/m² as calculated from the following:    Height as of 24: 177.8 cm (70\").    Weight as of this encounter: 129 kg (284 lb 12.8 oz).    Physical Exam    Ultrasound Impression:   See Viewpoint    Assessment and Plan     Alysia Lamas is a 39 y.o.  28w2d who presents today for diabetes    Diagnoses and all orders for this visit:    1. Type 2 diabetes mellitus without complication, unspecified whether long term insulin use (Primary)  Assessment & Plan:  Patient returns today for pregnancy complicated by type 2 diabetes.  Review of her continuous glucose monitor demonstrates reasonable control with " 81% of sugars within range 19% high.  None of her sugars were exceptionally high.  Her elevated sugars tend to be postprandial with her fastings being in a good range.  Patient notes no complications since her last visit and notes good fetal movement.    Ultrasound today demonstrates a normally grown fetus with no abnormality seen.  Amniotic fluid volume and umbilical artery Dopplers are normal.  Fetus is very active.    Patient was given a prescription for Humalog 4 units 3 times daily before meals.  Patient reports that she has not picked up this prescription yet.  We discussed the importance of glucose control to both her into the fetus.  Patient plans on obtaining the prescription in the near future and starting the short acting insulin.    We would recommend  testing beginning at 32 weeks gestation.  Patient was counseled extensively regarding fetal movement will contact her provider immediately if she notices any decreased fetal movement.    Orders:  -     [facility]; Future    2. Chronic hypertension affecting pregnancy  Assessment & Plan:  Patient returns today for pregnancy complicated by type 2 diabetes advanced her Orville and chronic hypertension.  Patient's blood pressure today is 127/67.  Patient is taking Procardia XL 30 mg daily.  Patient notes no complications and notes good fetal movement.    Ultrasound today demonstrates a normally grown fetus with no abnormality seen.  Amniotic fluid volume and umbilical artery Dopplers are normal.    Patient's blood pressure appears very under very good control on a single dose of Procardia XL.  We will not recommend any changes to her medication at this point.  Owing both to her diabetes and to her chronic hypertension we would recommend  testing beginning at 32 weeks gestation in Dr. Martinez's office.    Patient was counseled extensively regarding fetal movement and will contact her provider immediately if she notices  any decreased fetal movement.    Orders:  -     [facility]; Future    3. Morbid obesity with BMI of 40.0-44.9, adult  -     [facility]; Future    4. Pregnancy, unspecified gestational age  -     [facility]; Future         Follow Up  Return in about 4 weeks (around 2025).    I spent 20 minutes caring for the patient on the day of service. This included: obtaining or reviewing a separately obtained medical history, reviewing patient records, performing a medically appropriate exam and/or evaluation, counseling or educating the patient/family/caregiver, ordering medications, labs, and/or procedures and documenting such in the medical record. This does not include time spent on review and interpretation of other tests such as fetal ultrasound or the performance of other procedures such as amniocentesis or CVS.      Douglas A. Milligan, MD  Maternal Fetal Medicine, [facility]     2025

## 2025-07-03 ENCOUNTER — TELEPHONE (OUTPATIENT)
Dept: OBSTETRICS AND GYNECOLOGY | Facility: HOSPITAL | Age: 40
End: 2025-07-03
Payer: COMMERCIAL

## 2025-07-03 DIAGNOSIS — E11.9 TYPE 2 DIABETES MELLITUS WITHOUT COMPLICATION, WITHOUT LONG-TERM CURRENT USE OF INSULIN: ICD-10-CM

## 2025-07-03 NOTE — TELEPHONE ENCOUNTER
"Called pt to let her know Dr Lobo reviewed her blood sugar log and wants her to keep metformin at 1000mg twice daily but she needs to increase her lantus to 16 units at bedtime. Pt states she has not started the humalog because she hasn't picked up the medication. Told pt Dr Lobo really recommends she start taking the humalog 4 units TID 15 min before meals. Pt states she will  the medicine in a \"few days\".  "

## 2025-07-08 ENCOUNTER — PATIENT OUTREACH (OUTPATIENT)
Dept: LABOR AND DELIVERY | Facility: HOSPITAL | Age: 40
End: 2025-07-08
Payer: COMMERCIAL

## 2025-07-08 NOTE — OUTREACH NOTE
[facility]  Unable to Reach    Questions/Answers      Flowsheet Row Responses   Pending Outreach Prenatal Check-in   Call Attempt --  [Fourth]   Outcome No answer/busy, Left message   Next Call Attempt Date 07/15/25   Unable to reach comments: Will attempt to see in OB office next week at PN appointment.              ANTON Mercado RN  Maternity Nurse Navigator    7/8/2025, 09:36 EDT

## 2025-07-10 ENCOUNTER — MEDICATION THERAPY MANAGEMENT (OUTPATIENT)
Dept: OBSTETRICS AND GYNECOLOGY | Facility: HOSPITAL | Age: 40
End: 2025-07-10
Payer: COMMERCIAL

## 2025-07-10 ENCOUNTER — TELEPHONE (OUTPATIENT)
Dept: OBSTETRICS AND GYNECOLOGY | Facility: HOSPITAL | Age: 40
End: 2025-07-10
Payer: COMMERCIAL

## 2025-07-10 DIAGNOSIS — E11.9 TYPE 2 DIABETES MELLITUS WITHOUT COMPLICATION, WITHOUT LONG-TERM CURRENT USE OF INSULIN: ICD-10-CM

## 2025-07-10 NOTE — TELEPHONE ENCOUNTER
Attempted to reach patient by phone.  Left message stating a detailed Saqina message is being sent and to please respond to that.  If she has questions, please call the office at [phone].  Angela Caputo RN

## 2025-07-14 ENCOUNTER — TELEPHONE (OUTPATIENT)
Dept: OBSTETRICS AND GYNECOLOGY | Facility: HOSPITAL | Age: 40
End: 2025-07-14
Payer: COMMERCIAL

## 2025-07-14 PROBLEM — Z3A.30 30 WEEKS GESTATION OF PREGNANCY: Status: ACTIVE | Noted: 2025-07-01

## 2025-07-14 NOTE — TELEPHONE ENCOUNTER
Attempted to reach patient by phone.  Received message that voice mailbox has not been set up.  Angela Caputo RN

## 2025-07-15 ENCOUNTER — TELEPHONE (OUTPATIENT)
Dept: OBSTETRICS AND GYNECOLOGY | Facility: HOSPITAL | Age: 40
End: 2025-07-15
Payer: COMMERCIAL

## 2025-07-15 NOTE — TELEPHONE ENCOUNTER
Attempted to reach patient by phone.  Patient does not have a voice mailbox set up.  Will send Hukksterhart message.  Angela Caputo RN

## 2025-07-18 ENCOUNTER — PATIENT OUTREACH (OUTPATIENT)
Dept: LABOR AND DELIVERY | Facility: HOSPITAL | Age: 40
End: 2025-07-18
Payer: COMMERCIAL

## 2025-07-18 NOTE — OUTREACH NOTE
[facility]  Unable to Reach    Questions/Answers      Flowsheet Row Responses   Pending Outreach Prenatal Check-in   Call Attempt --  [Fifth]   Outcome No answer/busy, Left message, Check I'm Herehart message sent to patient   Next Call Attempt Date 08/18/25   Unable to reach comments: Third trimester resources sent via Check I'm Herehart message. Will attempt to see in office next time d/t inability to reach by phone.            ANTON Mercado RN  Maternity Nurse Navigator    7/18/2025, 09:11 EDT

## 2025-07-21 ENCOUNTER — TELEPHONE (OUTPATIENT)
Dept: OBSTETRICS AND GYNECOLOGY | Facility: HOSPITAL | Age: 40
End: 2025-07-21
Payer: COMMERCIAL

## 2025-07-21 NOTE — TELEPHONE ENCOUNTER
Attempted to reach patient by phone.  Left message stating no dexcom data since 7/10/25.  Checking to make sure she isn't having issues with her sensors and to see if she has been using a glucometer.  Requested patient return call to [phone].  Angela Caputo RN

## 2025-07-28 ENCOUNTER — TELEPHONE (OUTPATIENT)
Dept: OBSTETRICS AND GYNECOLOGY | Facility: HOSPITAL | Age: 40
End: 2025-07-28
Payer: COMMERCIAL

## 2025-07-28 PROBLEM — Z3A.32 32 WEEKS GESTATION OF PREGNANCY: Status: ACTIVE | Noted: 2025-07-01

## 2025-07-28 NOTE — TELEPHONE ENCOUNTER
Attempted to reach patient by phone.  Message left requesting patient bring blood sugar log in with her for her appointment in the morning.  Angela Caputo RN

## 2025-08-11 ENCOUNTER — ROUTINE PRENATAL (OUTPATIENT)
Dept: OBSTETRICS AND GYNECOLOGY | Facility: CLINIC | Age: 40
End: 2025-08-11
Payer: COMMERCIAL

## 2025-08-11 VITALS — WEIGHT: 274 LBS | BODY MASS INDEX: 39.31 KG/M2 | SYSTOLIC BLOOD PRESSURE: 130 MMHG | DIASTOLIC BLOOD PRESSURE: 76 MMHG

## 2025-08-11 DIAGNOSIS — E11.9 TYPE 2 DIABETES MELLITUS WITHOUT COMPLICATION, WITHOUT LONG-TERM CURRENT USE OF INSULIN: ICD-10-CM

## 2025-08-11 DIAGNOSIS — O99.210 OBESITY IN PREGNANCY, ANTEPARTUM: ICD-10-CM

## 2025-08-11 DIAGNOSIS — D56.3 ALPHA THALASSEMIA SILENT CARRIER: ICD-10-CM

## 2025-08-11 DIAGNOSIS — O24.919 DIABETES MELLITUS DURING PREGNANCY, ANTEPARTUM, UNSPECIFIED DIABETES MELLITUS TYPE: ICD-10-CM

## 2025-08-11 DIAGNOSIS — O99.019 MATERNAL ANEMIA IN PREGNANCY, ANTEPARTUM: ICD-10-CM

## 2025-08-11 DIAGNOSIS — O10.919 CHRONIC HYPERTENSION AFFECTING PREGNANCY: ICD-10-CM

## 2025-08-11 DIAGNOSIS — O10.919 CHRONIC HYPERTENSION AFFECTING PREGNANCY: Primary | ICD-10-CM

## 2025-08-11 DIAGNOSIS — O09.519 ADVANCED MATERNAL AGE, PRIMIGRAVIDA, ANTEPARTUM: ICD-10-CM

## 2025-08-11 PROBLEM — Z3A.34 34 WEEKS GESTATION OF PREGNANCY: Status: ACTIVE | Noted: 2025-07-01

## 2025-08-11 LAB
ACCELERATIONS > 10BPM: 7
ACCELERATIONS > 15 BPM: 3
ACOUSTIC STIMULATION: NO
DECELERATIONS: NORMAL
FHR VARIABILITIES: NORMAL
GLUCOSE UR STRIP-MCNC: ABNORMAL MG/DL
NST ASSESSMENT: REACTIVE
NST BASELINE: 135
NST DURATION: 30 MINUTES
NST INDICATIONS: NORMAL
NST LOCATIONS: NORMAL
NST READ BY: NORMAL
NST RETURN: NORMAL
PROT UR STRIP-MCNC: ABNORMAL MG/DL
UTERINE ACTIVITY: NO

## 2025-08-11 PROCEDURE — 99213 OFFICE O/P EST LOW 20 MIN: CPT | Performed by: OBSTETRICS & GYNECOLOGY

## 2025-08-11 PROCEDURE — 59025 FETAL NON-STRESS TEST: CPT | Performed by: OBSTETRICS & GYNECOLOGY

## 2025-08-11 RX ORDER — ONDANSETRON 4 MG/1
4 TABLET, ORALLY DISINTEGRATING ORAL EVERY 8 HOURS PRN
Qty: 30 TABLET | Refills: 1 | Status: SHIPPED | OUTPATIENT
Start: 2025-08-11

## 2025-08-13 ENCOUNTER — PREP FOR SURGERY (OUTPATIENT)
Dept: OTHER | Facility: HOSPITAL | Age: 40
End: 2025-08-13
Payer: COMMERCIAL

## 2025-08-13 ENCOUNTER — ROUTINE PRENATAL (OUTPATIENT)
Dept: OBSTETRICS AND GYNECOLOGY | Facility: CLINIC | Age: 40
End: 2025-08-13
Payer: COMMERCIAL

## 2025-08-13 ENCOUNTER — OFFICE VISIT (OUTPATIENT)
Dept: OBSTETRICS AND GYNECOLOGY | Facility: HOSPITAL | Age: 40
End: 2025-08-13
Payer: COMMERCIAL

## 2025-08-13 ENCOUNTER — HOSPITAL ENCOUNTER (OUTPATIENT)
Dept: WOMENS IMAGING | Facility: HOSPITAL | Age: 40
Discharge: HOME | End: 2025-08-13
Admitting: OBSTETRICS & GYNECOLOGY
Payer: COMMERCIAL

## 2025-08-13 VITALS — WEIGHT: 271 LBS | DIASTOLIC BLOOD PRESSURE: 75 MMHG | SYSTOLIC BLOOD PRESSURE: 113 MMHG | BODY MASS INDEX: 38.88 KG/M2

## 2025-08-13 VITALS — SYSTOLIC BLOOD PRESSURE: 113 MMHG | WEIGHT: 271 LBS | DIASTOLIC BLOOD PRESSURE: 75 MMHG | BODY MASS INDEX: 38.88 KG/M2

## 2025-08-13 DIAGNOSIS — D56.3 ALPHA THALASSEMIA SILENT CARRIER: ICD-10-CM

## 2025-08-13 DIAGNOSIS — O10.919 CHRONIC HYPERTENSION AFFECTING PREGNANCY: ICD-10-CM

## 2025-08-13 DIAGNOSIS — O09.519 ADVANCED MATERNAL AGE, PRIMIGRAVIDA, ANTEPARTUM: ICD-10-CM

## 2025-08-13 DIAGNOSIS — O09.90 SUPERVISION OF HIGH RISK PREGNANCY, ANTEPARTUM: Primary | ICD-10-CM

## 2025-08-13 DIAGNOSIS — E11.9 TYPE 2 DIABETES MELLITUS WITHOUT COMPLICATION, UNSPECIFIED WHETHER LONG TERM INSULIN USE: ICD-10-CM

## 2025-08-13 DIAGNOSIS — E11.9 TYPE 2 DIABETES MELLITUS WITHOUT COMPLICATION, UNSPECIFIED WHETHER LONG TERM INSULIN USE: Primary | ICD-10-CM

## 2025-08-13 DIAGNOSIS — O99.019 MATERNAL ANEMIA IN PREGNANCY, ANTEPARTUM: ICD-10-CM

## 2025-08-13 DIAGNOSIS — O99.210 OBESITY IN PREGNANCY, ANTEPARTUM: ICD-10-CM

## 2025-08-13 DIAGNOSIS — O10.919 CHRONIC HYPERTENSION AFFECTING PREGNANCY: Primary | ICD-10-CM

## 2025-08-13 DIAGNOSIS — O09.529 ANTEPARTUM MULTIGRAVIDA OF ADVANCED MATERNAL AGE: ICD-10-CM

## 2025-08-13 DIAGNOSIS — E11.9 TYPE 2 DIABETES MELLITUS WITHOUT COMPLICATION, WITHOUT LONG-TERM CURRENT USE OF INSULIN: ICD-10-CM

## 2025-08-13 PROCEDURE — 76816 OB US FOLLOW-UP PER FETUS: CPT

## 2025-08-13 PROCEDURE — 76819 FETAL BIOPHYS PROFIL W/O NST: CPT

## 2025-08-13 RX ORDER — ACETAMINOPHEN 325 MG/1
650 TABLET ORAL EVERY 4 HOURS PRN
OUTPATIENT
Start: 2025-08-13

## 2025-08-13 RX ORDER — PENICILLIN G 3000000 [IU]/50ML
3 INJECTION, SOLUTION INTRAVENOUS EVERY 4 HOURS
OUTPATIENT
Start: 2025-08-13 | End: 2025-08-15

## 2025-08-13 RX ORDER — OXYTOCIN/0.9 % SODIUM CHLORIDE 30/500 ML
2-20 PLASTIC BAG, INJECTION (ML) INTRAVENOUS
OUTPATIENT
Start: 2025-08-13

## 2025-08-13 RX ORDER — DEXTROSE MONOHYDRATE 25 G/50ML
25 INJECTION, SOLUTION INTRAVENOUS
OUTPATIENT
Start: 2025-08-13

## 2025-08-13 RX ORDER — MISOPROSTOL 100 MCG
25 TABLET ORAL ONCE
OUTPATIENT
Start: 2025-08-13 | End: 2025-08-13

## 2025-08-13 RX ORDER — SODIUM CHLORIDE 0.9 % (FLUSH) 0.9 %
10 SYRINGE (ML) INJECTION AS NEEDED
OUTPATIENT
Start: 2025-08-13

## 2025-08-13 RX ORDER — SODIUM CHLORIDE 9 MG/ML
40 INJECTION, SOLUTION INTRAVENOUS AS NEEDED
OUTPATIENT
Start: 2025-08-13

## 2025-08-13 RX ORDER — OXYTOCIN/0.9 % SODIUM CHLORIDE 30/500 ML
999 PLASTIC BAG, INJECTION (ML) INTRAVENOUS ONCE
OUTPATIENT
Start: 2025-08-13 | End: 2025-08-13

## 2025-08-13 RX ORDER — SODIUM CHLORIDE 0.9 % (FLUSH) 0.9 %
10 SYRINGE (ML) INJECTION EVERY 12 HOURS SCHEDULED
OUTPATIENT
Start: 2025-08-13

## 2025-08-13 RX ORDER — NICOTINE POLACRILEX 4 MG
15 LOZENGE BUCCAL
OUTPATIENT
Start: 2025-08-13

## 2025-08-13 RX ORDER — MAGNESIUM CARB/ALUMINUM HYDROX 105-160MG
30 TABLET,CHEWABLE ORAL ONCE
OUTPATIENT
Start: 2025-08-13 | End: 2025-08-13

## 2025-08-13 RX ORDER — LIDOCAINE HYDROCHLORIDE 10 MG/ML
0.5 INJECTION, SOLUTION EPIDURAL; INFILTRATION; INTRACAUDAL; PERINEURAL ONCE AS NEEDED
OUTPATIENT
Start: 2025-08-13

## 2025-08-13 RX ORDER — MISOPROSTOL 200 UG/1
800 TABLET ORAL ONCE AS NEEDED
OUTPATIENT
Start: 2025-08-13

## 2025-08-13 RX ORDER — AMLODIPINE BESYLATE 5 MG/1
1 TABLET ORAL DAILY
COMMUNITY
Start: 2025-08-07

## 2025-08-13 RX ORDER — BUTORPHANOL TARTRATE 2 MG/ML
2 INJECTION, SOLUTION INTRAMUSCULAR; INTRAVENOUS
OUTPATIENT
Start: 2025-08-13

## 2025-08-13 RX ORDER — CARBOPROST TROMETHAMINE 250 UG/ML
250 INJECTION, SOLUTION INTRAMUSCULAR
OUTPATIENT
Start: 2025-08-13

## 2025-08-13 RX ORDER — METHYLERGONOVINE MALEATE 0.2 MG/ML
200 INJECTION INTRAVENOUS ONCE AS NEEDED
OUTPATIENT
Start: 2025-08-13

## 2025-08-13 RX ORDER — SODIUM CHLORIDE, SODIUM LACTATE, POTASSIUM CHLORIDE, CALCIUM CHLORIDE 600; 310; 30; 20 MG/100ML; MG/100ML; MG/100ML; MG/100ML
125 INJECTION, SOLUTION INTRAVENOUS CONTINUOUS
OUTPATIENT
Start: 2025-08-13 | End: 2025-08-14

## 2025-08-13 RX ORDER — OXYTOCIN/0.9 % SODIUM CHLORIDE 30/500 ML
250 PLASTIC BAG, INJECTION (ML) INTRAVENOUS CONTINUOUS
OUTPATIENT
Start: 2025-08-13 | End: 2025-08-13

## 2025-08-14 ENCOUNTER — TELEPHONE (OUTPATIENT)
Dept: OBSTETRICS AND GYNECOLOGY | Facility: HOSPITAL | Age: 40
End: 2025-08-14
Payer: COMMERCIAL

## 2025-08-18 ENCOUNTER — ROUTINE PRENATAL (OUTPATIENT)
Dept: OBSTETRICS AND GYNECOLOGY | Facility: CLINIC | Age: 40
End: 2025-08-18
Payer: COMMERCIAL

## 2025-08-18 ENCOUNTER — PATIENT OUTREACH (OUTPATIENT)
Dept: LABOR AND DELIVERY | Facility: HOSPITAL | Age: 40
End: 2025-08-18
Payer: COMMERCIAL

## 2025-08-18 VITALS — DIASTOLIC BLOOD PRESSURE: 76 MMHG | WEIGHT: 272 LBS | BODY MASS INDEX: 39.03 KG/M2 | SYSTOLIC BLOOD PRESSURE: 120 MMHG

## 2025-08-18 DIAGNOSIS — O10.919 CHRONIC HYPERTENSION AFFECTING PREGNANCY: ICD-10-CM

## 2025-08-18 DIAGNOSIS — D56.3 ALPHA THALASSEMIA SILENT CARRIER: ICD-10-CM

## 2025-08-18 DIAGNOSIS — O09.529 ANTEPARTUM MULTIGRAVIDA OF ADVANCED MATERNAL AGE: Primary | ICD-10-CM

## 2025-08-18 DIAGNOSIS — Z3A.35 35 WEEKS GESTATION OF PREGNANCY: ICD-10-CM

## 2025-08-18 DIAGNOSIS — O99.210 OBESITY IN PREGNANCY, ANTEPARTUM: ICD-10-CM

## 2025-08-18 DIAGNOSIS — O99.019 MATERNAL ANEMIA IN PREGNANCY, ANTEPARTUM: ICD-10-CM

## 2025-08-18 LAB
ACCELERATIONS > 10BPM: 3
ACCELERATIONS > 15 BPM: 3
ACOUSTIC STIMULATION: NO
AMPHET+METHAMPHET UR QL: NEGATIVE
AMPHETAMINES UR QL: NEGATIVE
BARBITURATES UR QL SCN: NEGATIVE
BENZODIAZ UR QL SCN: NEGATIVE
BUPRENORPHINE SERPL-MCNC: NEGATIVE NG/ML
CANNABINOIDS SERPL QL: NEGATIVE
COCAINE UR QL: NEGATIVE
DECELERATIONS: NORMAL
FENTANYL UR-MCNC: NEGATIVE NG/ML
FHR VARIABILITIES: NORMAL
METHADONE UR QL SCN: NEGATIVE
NST ASSESSMENT: REACTIVE
NST BASELINE: 140
NST DURATION: 25 MINUTES
NST INDICATIONS: NORMAL
NST LOCATIONS: NORMAL
NST READ BY: NORMAL
NST RETURN: NORMAL
OPIATES UR QL: NEGATIVE
OXYCODONE UR QL SCN: NEGATIVE
PCP UR QL SCN: NEGATIVE
TRICYCLICS UR QL SCN: NEGATIVE
UTERINE ACTIVITY: NO

## 2025-08-18 PROCEDURE — 99213 OFFICE O/P EST LOW 20 MIN: CPT | Performed by: OBSTETRICS & GYNECOLOGY

## 2025-08-18 PROCEDURE — 80307 DRUG TEST PRSMV CHEM ANLYZR: CPT | Performed by: OBSTETRICS & GYNECOLOGY

## 2025-08-18 PROCEDURE — 81003 URINALYSIS AUTO W/O SCOPE: CPT | Performed by: OBSTETRICS & GYNECOLOGY

## 2025-08-18 PROCEDURE — 59025 FETAL NON-STRESS TEST: CPT | Performed by: OBSTETRICS & GYNECOLOGY

## 2025-08-18 PROCEDURE — 87086 URINE CULTURE/COLONY COUNT: CPT | Performed by: OBSTETRICS & GYNECOLOGY

## 2025-08-21 ENCOUNTER — ROUTINE PRENATAL (OUTPATIENT)
Dept: OBSTETRICS AND GYNECOLOGY | Facility: CLINIC | Age: 40
End: 2025-08-21
Payer: COMMERCIAL

## 2025-08-21 VITALS — BODY MASS INDEX: 39.03 KG/M2 | DIASTOLIC BLOOD PRESSURE: 76 MMHG | SYSTOLIC BLOOD PRESSURE: 130 MMHG | WEIGHT: 272 LBS

## 2025-08-21 DIAGNOSIS — O99.019 MATERNAL ANEMIA IN PREGNANCY, ANTEPARTUM: ICD-10-CM

## 2025-08-21 DIAGNOSIS — O24.919 DIABETES MELLITUS DURING PREGNANCY, ANTEPARTUM, UNSPECIFIED DIABETES MELLITUS TYPE: ICD-10-CM

## 2025-08-21 DIAGNOSIS — D56.3 ALPHA THALASSEMIA SILENT CARRIER: ICD-10-CM

## 2025-08-21 DIAGNOSIS — O10.919 CHRONIC HYPERTENSION AFFECTING PREGNANCY: Primary | ICD-10-CM

## 2025-08-21 DIAGNOSIS — E66.01 MORBID OBESITY WITH BMI OF 40.0-44.9, ADULT: ICD-10-CM

## 2025-08-21 DIAGNOSIS — Z3A.35 35 WEEKS GESTATION OF PREGNANCY: ICD-10-CM

## 2025-08-21 DIAGNOSIS — O09.529 ANTEPARTUM MULTIGRAVIDA OF ADVANCED MATERNAL AGE: ICD-10-CM

## 2025-08-21 DIAGNOSIS — O99.210 OBESITY IN PREGNANCY, ANTEPARTUM: ICD-10-CM

## 2025-08-21 DIAGNOSIS — O09.90 SUPERVISION OF HIGH RISK PREGNANCY, ANTEPARTUM: ICD-10-CM

## 2025-08-21 RX ORDER — FLUCONAZOLE 200 MG/1
200 TABLET ORAL DAILY
Qty: 5 TABLET | Refills: 0 | Status: SHIPPED | OUTPATIENT
Start: 2025-08-21

## 2025-08-25 ENCOUNTER — ROUTINE PRENATAL (OUTPATIENT)
Dept: OBSTETRICS AND GYNECOLOGY | Facility: CLINIC | Age: 40
End: 2025-08-25
Payer: COMMERCIAL

## 2025-08-25 VITALS — BODY MASS INDEX: 38.74 KG/M2 | WEIGHT: 270 LBS | DIASTOLIC BLOOD PRESSURE: 82 MMHG | SYSTOLIC BLOOD PRESSURE: 130 MMHG

## 2025-08-25 DIAGNOSIS — F17.200 SMOKER: ICD-10-CM

## 2025-08-25 DIAGNOSIS — D56.3 ALPHA THALASSEMIA SILENT CARRIER: ICD-10-CM

## 2025-08-25 DIAGNOSIS — O09.529 ANTEPARTUM MULTIGRAVIDA OF ADVANCED MATERNAL AGE: Primary | ICD-10-CM

## 2025-08-25 DIAGNOSIS — O99.019 MATERNAL ANEMIA IN PREGNANCY, ANTEPARTUM: ICD-10-CM

## 2025-08-25 DIAGNOSIS — Z3A.36 36 WEEKS GESTATION OF PREGNANCY: ICD-10-CM

## 2025-08-25 DIAGNOSIS — O99.210 OBESITY IN PREGNANCY, ANTEPARTUM: ICD-10-CM

## 2025-08-25 DIAGNOSIS — O10.919 CHRONIC HYPERTENSION AFFECTING PREGNANCY: ICD-10-CM

## 2025-08-25 LAB
ACCELERATIONS > 10BPM: 4
ACCELERATIONS > 15 BPM: 4
ACOUSTIC STIMULATION: NO
APPEARANCE UR: ABNORMAL
BACTERIA #/AREA URNS HPF: ABNORMAL /[HPF]
BACTERIA UR CULT: NORMAL
BACTERIA UR CULT: NORMAL
BILIRUB UR QL STRIP: NEGATIVE
CASTS URNS QL MICRO: ABNORMAL /LPF
COLOR UR: YELLOW
CRYSTALS URNS MICRO: ABNORMAL
DECELERATIONS: NORMAL
EPI CELLS #/AREA URNS HPF: >10 /HPF (ref 0–10)
FHR VARIABILITIES: NORMAL
GLUCOSE UR QL STRIP: NEGATIVE
HGB UR QL STRIP: NEGATIVE
KETONES UR QL STRIP: ABNORMAL
LEUKOCYTE ESTERASE UR QL STRIP: NEGATIVE
MICRO URNS: ABNORMAL
NITRITE UR QL STRIP: NEGATIVE
NST ASSESSMENT: REACTIVE
NST BASELINE: 135
NST DURATION: 25 MINUTES
NST INDICATIONS: NORMAL
NST LOCATIONS: NORMAL
NST READ BY: NORMAL
NST RETURN: NORMAL
PH UR STRIP: 6 [PH] (ref 5–7.5)
PROT UR QL STRIP: ABNORMAL
RBC #/AREA URNS HPF: ABNORMAL /HPF (ref 0–2)
SP GR UR STRIP: >=1.03 (ref 1–1.03)
UNIDENT CRYS URNS QL MICRO: PRESENT
URINALYSIS REFLEX: ABNORMAL
UROBILINOGEN UR STRIP-MCNC: 1 MG/DL (ref 0.2–1)
UTERINE ACTIVITY: YES
WBC #/AREA URNS HPF: ABNORMAL /HPF (ref 0–5)

## 2025-08-27 ENCOUNTER — MEDICATION THERAPY MANAGEMENT (OUTPATIENT)
Dept: OBSTETRICS AND GYNECOLOGY | Facility: HOSPITAL | Age: 40
End: 2025-08-27
Payer: COMMERCIAL

## 2025-08-27 ENCOUNTER — TELEPHONE (OUTPATIENT)
Dept: OBSTETRICS AND GYNECOLOGY | Facility: HOSPITAL | Age: 40
End: 2025-08-27
Payer: COMMERCIAL

## 2025-08-27 DIAGNOSIS — E11.9 TYPE 2 DIABETES MELLITUS WITHOUT COMPLICATION, WITHOUT LONG-TERM CURRENT USE OF INSULIN: ICD-10-CM

## 2025-08-27 RX ORDER — INSULIN LISPRO 100 [IU]/ML
6 INJECTION, SOLUTION INTRAVENOUS; SUBCUTANEOUS
Qty: 15 ML | Refills: 0 | Status: ON HOLD | OUTPATIENT
Start: 2025-08-27

## 2025-09-01 PROBLEM — O09.90 SUPERVISION OF HIGH-RISK PREGNANCY: Status: ACTIVE | Noted: 2025-09-01
